# Patient Record
Sex: MALE | Race: WHITE | NOT HISPANIC OR LATINO | Employment: FULL TIME | URBAN - METROPOLITAN AREA
[De-identification: names, ages, dates, MRNs, and addresses within clinical notes are randomized per-mention and may not be internally consistent; named-entity substitution may affect disease eponyms.]

---

## 2017-07-31 ENCOUNTER — PRE VISIT (OUTPATIENT)
Dept: CARDIOLOGY | Facility: CLINIC | Age: 52
End: 2017-07-31

## 2017-07-31 LAB
ANION GAP SERPL CALCULATED.3IONS-SCNC: NORMAL MMOL/L
BUN SERPL-MCNC: 14 MG/DL (ref 9–26)
CALCIUM SERPL-MCNC: 9.2 MG/DL (ref 8.4–10.2)
CHLORIDE SERPLBLD-SCNC: NORMAL MMOL/L
CHOLEST SERPL-MCNC: 124 MG/DL (ref 0–199)
CO2 SERPL-SCNC: 27 MMOL/L (ref 22–31)
CREAT SERPL-MCNC: 1.16 MG/DL (ref 0.73–1.18)
GFR SERPL CREATININE-BSD FRML MDRD: >60 ML/MIN/1.73M2
GLUCOSE SERPL-MCNC: 98 MG/DL (ref 70–99)
HDLC SERPL-MCNC: 33 MG/DL
LDLC SERPL CALC-MCNC: 79 MG/DL (ref 19–130)
NONHDLC SERPL-MCNC: NORMAL MG/DL
POTASSIUM SERPL-SCNC: 3.8 MMOL/L (ref 3.5–5.2)
SODIUM SERPL-SCNC: 140 MMOL/L (ref 136–145)
TRIGL SERPL-MCNC: 59 MG/DL (ref 4–149)

## 2017-08-02 ENCOUNTER — OFFICE VISIT (OUTPATIENT)
Dept: CARDIOLOGY | Facility: CLINIC | Age: 52
End: 2017-08-02
Attending: INTERNAL MEDICINE
Payer: COMMERCIAL

## 2017-08-02 VITALS
BODY MASS INDEX: 23.82 KG/M2 | DIASTOLIC BLOOD PRESSURE: 68 MMHG | HEART RATE: 80 BPM | HEIGHT: 76 IN | SYSTOLIC BLOOD PRESSURE: 104 MMHG | WEIGHT: 195.6 LBS

## 2017-08-02 DIAGNOSIS — I21.4 NSTEMI (NON-ST ELEVATED MYOCARDIAL INFARCTION) (H): Primary | ICD-10-CM

## 2017-08-02 PROCEDURE — 99213 OFFICE O/P EST LOW 20 MIN: CPT | Performed by: INTERNAL MEDICINE

## 2017-08-02 NOTE — MR AVS SNAPSHOT
"              After Visit Summary   8/2/2017    Colby Marx    MRN: [id number]           Patient Information     Date Of Birth          1965        Visit Information        Provider Department      8/2/2017 8:45 AM Marcellus Upton MD [facility] [place]        Today's Diagnoses     NSTEMI (non-ST elevated myocardial infarction) (H)    -  1       Follow-ups after your visit        Additional Services     Follow-Up with Cardiologist                 Future tests that were ordered for you today     Open Future Orders        Priority Expected Expires Ordered    Echocardiogram Routine 8/2/2018 8/3/2018 8/2/2017    Follow-Up with Cardiologist Routine 8/2/2018 8/3/2018 8/2/2017            Who to contact     If you have questions or need follow up information about today's clinic visit or your schedule please contact [facility] directly at [phone].  Normal or non-critical lab and imaging results will be communicated to you by MyChart, letter or phone within 4 business days after the clinic has received the results. If you do not hear from us within 7 days, please contact the clinic through Reluxhart or phone. If you have a critical or abnormal lab result, we will notify you by phone as soon as possible.  Submit refill requests through Znode or call your pharmacy and they will forward the refill request to us. Please allow 3 business days for your refill to be completed.          Additional Information About Your Visit        MyChart Information     Znode lets you send messages to your doctor, view your test results, renew your prescriptions, schedule appointments and more. To sign up, go to [email or web address] . Click on \"Log in\" on the left side of the screen, which will take you to the Welcome page. Then click on \"Sign up Now\" on the right side of the page.     You will be asked to enter the access code listed below, as well as " "some personal information. Please follow the directions to create your username and password.     Your access code is: [email or web address]  Expires: 10/31/2017  9:21 AM     Your access code will  in 90 days. If you need help or a new code, please call your [facility] clinic or [phone].        Care EveryWhere ID     This is your Care EveryWhere ID. This could be used by other organizations to access your [facility] medical records  [id number]        Your Vitals Were     Pulse Height BMI (Body Mass Index)             80 1.93 m (6' 4\") 23.81 kg/m2          Blood Pressure from Last 3 Encounters:   17 104/68   16 118/68   02/10/16 100/70    Weight from Last 3 Encounters:   17 88.7 kg (195 lb 9.6 oz)   16 87.1 kg (192 lb)   02/10/16 89 kg (196 lb 4.8 oz)              We Performed the Following     Follow-Up with Cardiologist        Primary Care Provider Office Phone # Fax #    Saleem Gonzalez [phone]       [facility] [address] [place] [address]        Equal Access to Services     TAI AGUILAR : Hadii aad ku hadasho Soomaali, waaxda luqadaha, qaybta kaalmada adeegyada, waxay idiin hayaan monica toro lawilbert . So [facility] [phone].    ATENCIÓN: Si habla español, tiene a ramsey disposición servicios gratuitos de asistencia lingüística. Llame al [phone].    We comply with applicable federal civil rights laws and [place] laws. We do not discriminate on the basis of race, color, national origin, age, disability sex, sexual orientation or gender identity.            Thank you!     Thank you for choosing [facility]  for your care. Our goal is always to provide you with excellent care. Hearing back from our patients is one way we can continue to improve our services. Please take a few minutes to complete the written survey that you may receive in the mail after your visit with us. Thank you!             Your " Updated Medication List - Protect others around you: Learn how to safely use, store and throw away your medicines at www.disposemymeds.org.          This list is accurate as of: 8/2/17  9:21 AM.  Always use your most recent med list.                   Brand Name Dispense Instructions for use Diagnosis    aspirin 81 MG EC tablet      Take 1 tablet (81 mg) by mouth daily    NSTEMI (non-ST elevated myocardial infarction) (H)       COENZYME Q-10 PO      Take 100 mg by mouth daily        GINKOBA PO      Take 120 mg by mouth daily        MAGNESIUM OXIDE PO      Take 350 mg by mouth daily        MENS PROSTATE HEALTH FORMULA PO      Take 2 tablets by mouth daily        multivitamin, therapeutic with minerals Tabs tablet      Take 1 tablet by mouth daily Men's Multi Vitamin        * NONFORMULARY      Nioxin Hair Replacement Drops Rub into bald spot on head twice daily.        * NONFORMULARY      4 tablets daily [employer] (Nutritional Supplement)        * NONFORMULARY      Take by mouth daily CoCumerin, Electrolyte Replacement, 5 Hour Energy Beverage        PROBIOTIC DAILY PO      Take 2 tablets by mouth daily        STATIN NOT PRESCRIBED (INTENTIONAL)      Statin not prescribed intentionally due to normal coronary arteries and does not have significant elevated LDL.        VALTREX PO      Take 500 mg by mouth daily as needed (Mouth sores)        * Notice:  This list has 3 medication(s) that are the same as other medications prescribed for you. Read the directions carefully, and ask your doctor or other care provider to review them with you.

## 2017-08-02 NOTE — PROGRESS NOTES
HPI and Plan:     Mr. Marx is a pleasant 52-year-old gentleman with a history of a non-ST segment elevation MI in 08/2015 for which he eventually went to cardiac catheterization and was found to have normal coronary arteries.  I sent him for cardiac MRI which detailed a prior inferior infarction with an ejection fraction quantitated at 53%.  On delayed enhancement imaging, there was a subendocardial area of hyper-enhancement involving 51-75% of the basal inferior wall.  This was consistent with a small myocardial infarction in the right coronary artery.  It is presumed that the patient had a cardioembolic event and he did wear a monitor which showed no evidence for paroxysmal atrial fibrillation.  Interestingly, his sister was diagnosed with SCAD, however, there was no indication that the patient had a spontaneous coronary artery dissection on his cardiac catheterization.  He returns in routine annual follow-up and is feeling well from a cardiovascular standpoint.  He is exercising regularly and has had no cardiovascular complaints such as chest pain, chest pressure, shortness of breath, orthopnea, or paroxysmal nocturnal dyspnea.     Orders Placed This Encounter   Procedures     Follow-Up with Cardiologist     Echocardiogram       No orders of the defined types were placed in this encounter.      Medications Discontinued During This Encounter   Medication Reason     ranitidine (ZANTAC) 150 MG tablet Stopped by Patient         Encounter Diagnosis   Name Primary?     NSTEMI (non-ST elevated myocardial infarction) (H) Yes       CURRENT MEDICATIONS:  Current Outpatient Prescriptions   Medication Sig Dispense Refill     STATIN NOT PRESCRIBED, INTENTIONAL, Statin not prescribed intentionally due to normal coronary arteries and does not have significant elevated LDL.       aspirin EC 81 MG EC tablet Take 1 tablet (81 mg) by mouth daily       ValACYclovir HCl (VALTREX PO) Take 500 mg by mouth daily as needed (Mouth sores)        NONFORMULARY Nioxin Hair Replacement Drops  Rub into bald spot on head twice daily.       NONFORMULARY 4 tablets daily [employer] (Nutritional Supplement)       COENZYME Q-10 PO Take 100 mg by mouth daily       Ginkgo Biloba (GINKOBA PO) Take 120 mg by mouth daily       multivitamin, therapeutic with minerals (THERA-VIT-M) TABS Take 1 tablet by mouth daily Men's Multi Vitamin       Probiotic Product (PROBIOTIC DAILY PO) Take 2 tablets by mouth daily       MAGNESIUM OXIDE PO Take 350 mg by mouth daily       Misc Natural Products (MENS PROSTATE HEALTH FORMULA PO) Take 2 tablets by mouth daily       NONFORMULARY Take by mouth daily CoCumerin, Electrolyte Replacement, 5 Hour Energy Beverage         ALLERGIES   No Known Allergies    PAST MEDICAL HISTORY:  Past Medical History:   Diagnosis Date     Family history of abdominal aortic aneurysm      Family history of coronary artery disease     sister had SCAD episode     Gastro-oesophageal reflux disease      NSTEMI (non-ST elevated myocardial infarction) (H)     Aug 2015, cardiac cath: normal coronaries     Sleep apnea        PAST SURGICAL HISTORY:  No past surgical history on file.    FAMILY HISTORY:  Family History   Problem Relation Age of Onset     Coronary Artery Disease Mother 80     CABG     Coronary Artery Disease Father 56     MI     Coronary Artery Disease Sister 59     MI       SOCIAL HISTORY:  Social History     Social History     Marital status: Single     Spouse name: N/A     Number of children: N/A     Years of education: N/A     Social History Main Topics     Smoking status: Never Smoker     Smokeless tobacco: None     Alcohol use Yes      Comment: rare     Drug use: No     Sexual activity: Not Asked     Other Topics Concern     Parent/Sibling W/ Cabg, Mi Or Angioplasty Before 65f 55m? Yes     Caffeine Concern No     Special Diet Yes     Gluten and dairy free     Exercise Yes     2-3 times week, pilates, ellyptical and weights, yoga     Seat Belt  "Yes     Social History Narrative       Review of Systems:  Skin:  Negative       Eyes:  Positive for glasses reading  ENT:  Positive for nasal congestion    Respiratory:  Negative       Cardiovascular:  Negative      Gastroenterology: Positive for heartburn occ heartburn takes tums for them  Genitourinary:         Musculoskeletal:  Positive for back pain;neck pain    Neurologic:  Negative      Psychiatric:  Positive for excessive stress stress due to work  Heme/Lymph/Imm:  Negative      Endocrine:  Negative        Physical Exam:  Vitals: /68 (BP Location: Right arm, Patient Position: Sitting, Cuff Size: Adult Regular)  Pulse 80  Ht 1.93 m (6' 4\")  Wt 88.7 kg (195 lb 9.6 oz)  BMI 23.81 kg/m2    Constitutional:  cooperative;alert and oriented;well developed;in no acute distress        Skin:  warm and dry to the touch        Head:  normocephalic        Eyes:  pupils equal and round;sclera white        ENT:  no pallor or cyanosis        Neck:  JVP normal        Chest:  clear to auscultation          Cardiac: regular rhythm;normal S1 and S2                  Abdomen:  abdomen soft        Vascular: pulses full and equal                                        Extremities and Back:  no edema              Neurological:  no gross motor deficits;affect appropriate        Impression/plan:    The patient is a pleasant 52-year-old gentleman with a likely cardioembolic inferior myocardial infarction for which we have treated him with a baby aspirin.  He has not had a recurrent event since being placed on an antiplatelet.  His cardiac catheterization detailed no evidence for any atherosclerosis and given his baseline LDL we have elected not to initiate statin therapy.  He has also had no difficulties with arrhythmias and we have not used a beta blocker as his EF is low normal.  At this time I would recommend the patient return in one year with a repeat transthoracic echocardiogram to be completed at that time.    Marcellus " MD Alexsandra        CC  Marcellus Upton MD   [facility]  [address]  [place] [id number]

## 2017-08-02 NOTE — LETTER
8/2/2017    Saleem Gonzalez MD  [facility]   [address] [place] [address]    RE: Colby Marx       Dear Colleague,    I had the pleasure of seeing Colby Marx in the [facility].  HPI and Plan:     Mr. Marx is a pleasant 52-year-old gentleman with a history of a non-ST segment elevation MI in 08/2015 for which he eventually went to cardiac catheterization and was found to have normal coronary arteries.  I sent him for cardiac MRI which detailed a prior inferior infarction with an ejection fraction quantitated at 53%.  On delayed enhancement imaging, there was a subendocardial area of hyper-enhancement involving 51-75% of the basal inferior wall.  This was consistent with a small myocardial infarction in the right coronary artery.  It is presumed that the patient had a cardioembolic event and he did wear a monitor which showed no evidence for paroxysmal atrial fibrillation.  Interestingly, his sister was diagnosed with SCAD, however, there was no indication that the patient had a spontaneous coronary artery dissection on his cardiac catheterization.  He returns in routine annual follow-up and is feeling well from a cardiovascular standpoint.  He is exercising regularly and has had no cardiovascular complaints such as chest pain, chest pressure, shortness of breath, orthopnea, or paroxysmal nocturnal dyspnea.     Orders Placed This Encounter   Procedures     Follow-Up with Cardiologist     Echocardiogram       No orders of the defined types were placed in this encounter.      Medications Discontinued During This Encounter   Medication Reason     ranitidine (ZANTAC) 150 MG tablet Stopped by Patient         Encounter Diagnosis   Name Primary?     NSTEMI (non-ST elevated myocardial infarction) (H) Yes       CURRENT MEDICATIONS:  Current Outpatient Prescriptions   Medication Sig Dispense Refill     STATIN NOT PRESCRIBED, INTENTIONAL, Statin not prescribed  intentionally due to normal coronary arteries and does not have significant elevated LDL.       aspirin EC 81 MG EC tablet Take 1 tablet (81 mg) by mouth daily       ValACYclovir HCl (VALTREX PO) Take 500 mg by mouth daily as needed (Mouth sores)       NONFORMULARY Nioxin Hair Replacement Drops  Rub into bald spot on head twice daily.       NONFORMULARY 4 tablets daily [employer] (Nutritional Supplement)       COENZYME Q-10 PO Take 100 mg by mouth daily       Ginkgo Biloba (GINKOBA PO) Take 120 mg by mouth daily       multivitamin, therapeutic with minerals (THERA-VIT-M) TABS Take 1 tablet by mouth daily Men's Multi Vitamin       Probiotic Product (PROBIOTIC DAILY PO) Take 2 tablets by mouth daily       MAGNESIUM OXIDE PO Take 350 mg by mouth daily       Misc Natural Products (MENS PROSTATE HEALTH FORMULA PO) Take 2 tablets by mouth daily       NONFORMULARY Take by mouth daily CoCumerin, Electrolyte Replacement, 5 Hour Energy Beverage         ALLERGIES   No Known Allergies    PAST MEDICAL HISTORY:  Past Medical History:   Diagnosis Date     Family history of abdominal aortic aneurysm      Family history of coronary artery disease     sister had SCAD episode     Gastro-oesophageal reflux disease      NSTEMI (non-ST elevated myocardial infarction) (H)     Aug 2015, cardiac cath: normal coronaries     Sleep apnea        PAST SURGICAL HISTORY:  No past surgical history on file.    FAMILY HISTORY:  Family History   Problem Relation Age of Onset     Coronary Artery Disease Mother 80     CABG     Coronary Artery Disease Father 56     MI     Coronary Artery Disease Sister 59     MI       SOCIAL HISTORY:  Social History     Social History     Marital status: Single     Spouse name: N/A     Number of children: N/A     Years of education: N/A     Social History Main Topics     Smoking status: Never Smoker     Smokeless tobacco: None     Alcohol use Yes      Comment: rare     Drug use: No     Sexual activity: Not Asked  "    Other Topics Concern     Parent/Sibling W/ Cabg, Mi Or Angioplasty Before 65f 55m? Yes     Caffeine Concern No     Special Diet Yes     Gluten and dairy free     Exercise Yes     2-3 times week, pilates, ellyptical and weights, yoga     Seat Belt Yes     Social History Narrative       Review of Systems:  Skin:  Negative       Eyes:  Positive for glasses reading  ENT:  Positive for nasal congestion    Respiratory:  Negative       Cardiovascular:  Negative      Gastroenterology: Positive for heartburn occ heartburn takes tums for them  Genitourinary:         Musculoskeletal:  Positive for back pain;neck pain    Neurologic:  Negative      Psychiatric:  Positive for excessive stress stress due to work  Heme/Lymph/Imm:  Negative      Endocrine:  Negative        Physical Exam:  Vitals: /68 (BP Location: Right arm, Patient Position: Sitting, Cuff Size: Adult Regular)  Pulse 80  Ht 1.93 m (6' 4\")  Wt 88.7 kg (195 lb 9.6 oz)  BMI 23.81 kg/m2    Constitutional:  cooperative;alert and oriented;well developed;in no acute distress        Skin:  warm and dry to the touch        Head:  normocephalic        Eyes:  pupils equal and round;sclera white        ENT:  no pallor or cyanosis        Neck:  JVP normal        Chest:  clear to auscultation          Cardiac: regular rhythm;normal S1 and S2                  Abdomen:  abdomen soft        Vascular: pulses full and equal                                        Extremities and Back:  no edema              Neurological:  no gross motor deficits;affect appropriate        Impression/plan:    The patient is a pleasant 52-year-old gentleman with a likely cardioembolic inferior myocardial infarction for which we have treated him with a baby aspirin.  He has not had a recurrent event since being placed on an antiplatelet.  His cardiac catheterization detailed no evidence for any atherosclerosis and given his baseline LDL we have elected not to initiate statin therapy.  He has " also had no difficulties with arrhythmias and we have not used a beta blocker as his EF is low normal.  At this time I would recommend the patient return in one year with a repeat transthoracic echocardiogram to be completed at that time.    Thank you for allowing me to participate in the care of your patient.    Sincerely,     Marcellus Upton MD     [facility]

## 2018-05-08 ENCOUNTER — HOSPITAL ENCOUNTER (EMERGENCY)
Facility: CLINIC | Age: 53
Discharge: HOME | End: 2018-05-08
Attending: EMERGENCY MEDICINE | Admitting: EMERGENCY MEDICINE
Payer: COMMERCIAL

## 2018-05-08 VITALS
DIASTOLIC BLOOD PRESSURE: 90 MMHG | RESPIRATION RATE: 8 BRPM | HEIGHT: 76 IN | BODY MASS INDEX: 23.02 KG/M2 | SYSTOLIC BLOOD PRESSURE: 119 MMHG | WEIGHT: 189 LBS | OXYGEN SATURATION: 98 % | TEMPERATURE: 97.7 F

## 2018-05-08 DIAGNOSIS — I48.19 PERSISTENT ATRIAL FIBRILLATION (H): ICD-10-CM

## 2018-05-08 LAB
ANION GAP SERPL CALCULATED.3IONS-SCNC: 8 MMOL/L (ref 3–14)
BASOPHILS # BLD AUTO: 0 10E9/L (ref 0–0.2)
BASOPHILS NFR BLD AUTO: 0.1 %
BUN SERPL-MCNC: 17 MG/DL (ref 7–30)
CALCIUM SERPL-MCNC: 9.1 MG/DL (ref 8.5–10.1)
CHLORIDE SERPL-SCNC: 108 MMOL/L (ref 94–109)
CO2 SERPL-SCNC: 27 MMOL/L (ref 20–32)
CREAT SERPL-MCNC: 1.13 MG/DL (ref 0.66–1.25)
DIFFERENTIAL METHOD BLD: ABNORMAL
EOSINOPHIL # BLD AUTO: 0 10E9/L (ref 0–0.7)
EOSINOPHIL NFR BLD AUTO: 0.4 %
ERYTHROCYTE [DISTWIDTH] IN BLOOD BY AUTOMATED COUNT: 12.5 % (ref 10–15)
GFR SERPL CREATININE-BSD FRML MDRD: 67 ML/MIN/1.7M2
GLUCOSE SERPL-MCNC: 117 MG/DL (ref 70–99)
HCT VFR BLD AUTO: 43.9 % (ref 40–53)
HGB BLD-MCNC: 15.2 G/DL (ref 13.3–17.7)
IMM GRANULOCYTES # BLD: 0 10E9/L (ref 0–0.4)
IMM GRANULOCYTES NFR BLD: 0.2 %
INTERPRETATION ECG - MUSE: NORMAL
LYMPHOCYTES # BLD AUTO: 1.8 10E9/L (ref 0.8–5.3)
LYMPHOCYTES NFR BLD AUTO: 19.8 %
MCH RBC QN AUTO: 33.2 PG (ref 26.5–33)
MCHC RBC AUTO-ENTMCNC: 34.6 G/DL (ref 31.5–36.5)
MCV RBC AUTO: 96 FL (ref 78–100)
MONOCYTES # BLD AUTO: 0.8 10E9/L (ref 0–1.3)
MONOCYTES NFR BLD AUTO: 9.3 %
NEUTROPHILS # BLD AUTO: 6.3 10E9/L (ref 1.6–8.3)
NEUTROPHILS NFR BLD AUTO: 70.2 %
NRBC # BLD AUTO: 0 10*3/UL
NRBC BLD AUTO-RTO: 0 /100
PLATELET # BLD AUTO: 168 10E9/L (ref 150–450)
POTASSIUM SERPL-SCNC: 3.6 MMOL/L (ref 3.4–5.3)
RBC # BLD AUTO: 4.58 10E12/L (ref 4.4–5.9)
SODIUM SERPL-SCNC: 143 MMOL/L (ref 133–144)
TROPONIN I SERPL-MCNC: <0.015 UG/L (ref 0–0.04)
TSH SERPL DL<=0.005 MIU/L-ACNC: 2.46 MU/L (ref 0.4–4)
WBC # BLD AUTO: 9 10E9/L (ref 4–11)

## 2018-05-08 PROCEDURE — 84443 ASSAY THYROID STIM HORMONE: CPT | Performed by: EMERGENCY MEDICINE

## 2018-05-08 PROCEDURE — 99284 EMERGENCY DEPT VISIT MOD MDM: CPT

## 2018-05-08 PROCEDURE — 85025 COMPLETE CBC W/AUTO DIFF WBC: CPT | Performed by: EMERGENCY MEDICINE

## 2018-05-08 PROCEDURE — 84484 ASSAY OF TROPONIN QUANT: CPT | Performed by: EMERGENCY MEDICINE

## 2018-05-08 PROCEDURE — 93005 ELECTROCARDIOGRAM TRACING: CPT

## 2018-05-08 PROCEDURE — 80048 BASIC METABOLIC PNL TOTAL CA: CPT | Performed by: EMERGENCY MEDICINE

## 2018-05-08 ASSESSMENT — ENCOUNTER SYMPTOMS
PALPITATIONS: 1
FEVER: 0
COUGH: 0
ABDOMINAL PAIN: 0
SHORTNESS OF BREATH: 0

## 2018-05-08 NOTE — ED AVS SNAPSHOT
Emergency Department    [address] [place] [address]    Phone:  [phone]    Fax:  [phone]                                       Colby Marx   MRN: [id number]    Department:   Emergency Department   Date of Visit:  5/8/2018           After Visit Summary Signature Page     I have received my discharge instructions, and my questions have been answered. I have discussed any challenges I see with this plan with the nurse or doctor.    ..........................................................................................................................................  Patient/Patient Representative Signature      ..........................................................................................................................................  Patient Representative Print Name and Relationship to Patient    ..................................................               ................................................  Date                                            Time    ..........................................................................................................................................  Reviewed by Signature/Title    ...................................................              ..............................................  Date                                                            Time

## 2018-05-08 NOTE — ED AVS SNAPSHOT
Emergency Department    [address] [facility] [address]    Phone:  [phone]    Fax:  [phone]                                       Colby Marx   MRN: [id number]    Department:   Emergency Department   Date of Visit:  5/8/2018           Patient Information     Date Of Birth          1965        Your diagnoses for this visit were:     Persistent atrial fibrillation (H)        You were seen by Deion Li MD.      Follow-up Information     Schedule an appointment as soon as possible for a visit with Marcellus Upton MD.    Specialty:  Cardiology    Contact information:    [address]  [facility] [id number] [phone]          Follow up with  Emergency Department.    Specialty:  EMERGENCY MEDICINE    Why:  As needed, If symptoms worsen    Contact information:    [address] [facility] [id number] [phone]        Discharge Instructions         Discharge Instructions for Atrial Fibrillation  You have been diagnosed with an abnormal heart rhythm called atrial fibrillation. With this condition, your heart s 2 upper chambers quiver rather than squeeze the blood out in a normal pattern. This leads to an irregular and sometimes rapid heartbeat. Some people will develop associated symptoms such as a flip-flopping heartbeat, chest pain, lightheadedness, or shortness of breath. Other people may have no symptoms at all. Atrial fibrillation is serious because it affects the heart s ability to fill with blood as it should. Blood clots may form. This increases the risk for stroke. Untreated atrial fibrillation can also lead to heart failure. Atrial fibrillation can be controlled. With treatment, most people with atrial fibrillation lead normal lives.  Treatment options  Recommended treatment for atrial fibrillation depends on your age, symptoms, how long you have had atrial fibrillation, and other factors. You will have a complete evaluation to find out if  you have any abnormalities that caused your heart to go into atrial fibrillation. This might be blocked heart arteries or a thyroid problem. Your doctor will assess your particular case and discuss choices with you.  Treatment choices may include:    Treating an underlying disorder that puts you at risk for atrial fibrillation. For example, correcting an abnormal thyroid or electrolyte problem, or treating a blocked heart artery.    Restoring a normal heart rhythm with an electrical shock (cardioversion) or with an antiarrhythmic medicine (chemical cardioversion)    Using medicine to control your heart rate in atrial fibrillation.    Preventing the risk for blood clot and stroke using blood-thinning medicines. Your doctor will tell you what he or she recommends. Choices may include aspirin, clopidogrel, warfarin, dabigatran, rivaroxaban, apixaban, and edoxaban.    Doing catheter ablation or a surgical maze procedure. These use different methods to destroy certain areas of heart tissue. This interrupts the electrical signals causing atrial fibrillation. One of these procedures may be a choice when medicines do not work, or as an alternative to long-term medicine.    Other treatment choices may be recommended for you by your doctor.  Managing risk factors for stroke and preventing heart failure are important parts of any treatment plan for atrial fibrillation.  Home care    Take your medicines exactly as directed. Don t skip doses.    Work with your doctor to find the right medicines and doses for you.    Learn to take your own pulse. Keep a record of your results. Ask your doctor which pulse rates mean that you need medical attention. Slowing your pulse is often the goal of treatment. Ask your doctor if it s OK for you to use an automatic machine to check your pulse at home. Sometimes these machines don t count the pulse correctly when you have atrial fibrillation.    Limit your intake of coffee, tea, cola, and other  beverages with caffeine. Talk with your doctor about whether you should eliminate caffeine.    Avoid over-the-counter medicines that have caffeine in them.    Let your doctor know what medicines you take, including prescription and over-the-counter medicines, as well as any supplements. They interfere with some medicines given for atrial fibrillation.    Ask your doctor about whether you can drink alcohol. Some people need to avoid alcohol to better treat atrial fibrillation. If you are taking blood-thinner medicines, alcohol may interfere with them by increasing their effect.    Never take stimulants such as amphetamines or cocaine. These drugs can speed up your heart rate and trigger atrial fibrillation.  Follow-up care  Follow up with your doctor, or as advised.     When should I call my healthcare provider  Call your healthcare provider right away if you have any of the following:    Weakness    Dizziness    Fainting    Fatigue    Shortness of breath    Chest pain with increased activity    A change in the usual regularity of your heartbeat, or an unusually fast heartbeat   Date Last Reviewed: 4/23/2016 2000-2017 [employer] [address] [place] [address]. All rights reserved. This information is not intended as a substitute for professional medical care. Always follow your healthcare professional's instructions.          Your next 10 appointments already scheduled     Aug 06, 2018  1:30 PM CDT   Ech Complete with [id number]   [facility] ([facility])    [address]  [facility] [address] [phone]           1. Please bring or wear a comfortable two-piece outfit. 2. You may eat, drink and take your normal medicines. 3. For any questions that cannot be answered, please contact the ordering physician ***Please check-in at the [place] Registration Office located in [address] in the [facility] [place] building.  When you are finished registering, please go to [address] and have a seat. The technician will call your name for the test.            Aug 06, 2018  4:15 PM CDT   Return Visit with Marcellus Upton MD   [facility] ([facility])    [address]  [facility] [address] [phone]              24 Hour Appointment Hotline       To make an appointment at any [facility], call [phone] ([phone]). If you don't have a family doctor or clinic, we will help you find one. [facility] clinics are conveniently located to serve the needs of you and your family.             Review of your medicines      Our records show that you are taking the medicines listed below. If these are incorrect, please call your family doctor or clinic.        Dose / Directions Last dose taken    aspirin 81 MG EC tablet   Dose:  81 mg        Take 1 tablet (81 mg) by mouth daily   Refills:  0        COENZYME Q-10 PO   Dose:  100 mg        Take 100 mg by mouth daily   Refills:  0        GINKOBA PO   Dose:  120 mg        Take 120 mg by mouth daily   Refills:  0        MAGNESIUM OXIDE PO   Dose:  350 mg        Take 350 mg by mouth daily   Refills:  0        MENS PROSTATE HEALTH FORMULA PO   Dose:  2 tablet        Take 2 tablets by mouth daily   Refills:  0        multivitamin, therapeutic with minerals Tabs tablet   Dose:  1 tablet        Take 1 tablet by mouth daily Men's Multi Vitamin   Refills:  0        NONFORMULARY        Nioxin Hair Replacement Drops Rub into bald spot on head twice daily.   Refills:  0        NONFORMULARY   Dose:  4 tablet        4 tablets daily [employer] (Nutritional Supplement)   Refills:  0        NONFORMULARY        Take by mouth daily CoCumerin, Electrolyte Replacement, 5 Hour Energy Beverage   Refills:  0        PROBIOTIC DAILY PO   Dose:  2 tablet        Take 2 tablets by mouth daily   Refills:  0        STATIN NOT PRESCRIBED  (INTENTIONAL)        Statin not prescribed intentionally due to normal coronary arteries and does not have significant elevated LDL.   Refills:  0        VALTREX PO   Dose:  500 mg        Take 500 mg by mouth daily as needed (Mouth sores)   Refills:  0                Procedures and tests performed during your visit     Basic metabolic panel    CBC with platelets + differential    EKG 12-lead, tracing only    TSH with free T4 reflex    Troponin I (now)      Orders Needing Specimen Collection     None      Pending Results     No orders found from 5/6/2018 to 5/9/2018.            Pending Culture Results     No orders found from 5/6/2018 to 5/9/2018.            Pending Results Instructions     If you had any lab results that were not finalized at the time of your Discharge, you can call the ED Lab Result RN at [phone]. You will be contacted by this team for any positive Lab results or changes in treatment. The nurses are available 7 days a week from 10A to 6:30P.  You can leave a message 24 hours per day and they will return your call.        Test Results From Your Hospital Stay        5/8/2018  7:36 PM      Component Results     Component Value Ref Range & Units Status    WBC 9.0 4.0 - 11.0 10e9/L Final    RBC Count 4.58 4.4 - 5.9 10e12/L Final    Hemoglobin 15.2 13.3 - 17.7 g/dL Final    Hematocrit 43.9 40.0 - 53.0 % Final    MCV 96 78 - 100 fl Final    MCH 33.2 (H) 26.5 - 33.0 pg Final    MCHC 34.6 31.5 - 36.5 g/dL Final    RDW 12.5 10.0 - 15.0 % Final    Platelet Count 168 150 - 450 10e9/L Final    Diff Method Automated Method  Final    % Neutrophils 70.2 % Final    % Lymphocytes 19.8 % Final    % Monocytes 9.3 % Final    % Eosinophils 0.4 % Final    % Basophils 0.1 % Final    % Immature Granulocytes 0.2 % Final    Nucleated RBCs 0 0 /100 Final    Absolute Neutrophil 6.3 1.6 - 8.3 10e9/L Final    Absolute Lymphocytes 1.8 0.8 - 5.3 10e9/L Final    Absolute Monocytes 0.8 0.0 - 1.3 10e9/L Final    Absolute  Eosinophils 0.0 0.0 - 0.7 10e9/L Final    Absolute Basophils 0.0 0.0 - 0.2 10e9/L Final    Abs Immature Granulocytes 0.0 0 - 0.4 10e9/L Final    Absolute Nucleated RBC 0.0  Final         5/8/2018  7:47 PM      Component Results     Component Value Ref Range & Units Status    Sodium 143 133 - 144 mmol/L Final    Potassium 3.6 3.4 - 5.3 mmol/L Final    Chloride 108 94 - 109 mmol/L Final    Carbon Dioxide 27 20 - 32 mmol/L Final    Anion Gap 8 3 - 14 mmol/L Final    Glucose 117 (H) 70 - 99 mg/dL Final    Urea Nitrogen 17 7 - 30 mg/dL Final    Creatinine 1.13 0.66 - 1.25 mg/dL Final    GFR Estimate 67 >60 mL/min/1.7m2 Final    Non  GFR Calc    GFR Estimate If Black 82 >60 mL/min/1.7m2 Final    African American GFR Calc    Calcium 9.1 8.5 - 10.1 mg/dL Final         5/8/2018  7:51 PM      Component Results     Component Value Ref Range & Units Status    Troponin I ES <0.015 0.000 - 0.045 ug/L Final    The 99th percentile for upper reference range is 0.045 ug/L.  Troponin values   in the range of 0.045 - 0.120 ug/L may be associated with risks of adverse   clinical events.           5/8/2018  7:56 PM      Component Results     Component Value Ref Range & Units Status    TSH 2.46 0.40 - 4.00 mU/L Final                Clinical Quality Measure: Blood Pressure Screening     Your blood pressure was checked while you were in the emergency department today. The last reading we obtained was  BP: 109/84 . Please read the guidelines below about what these numbers mean and what you should do about them.  If your systolic blood pressure (the top number) is less than 120 and your diastolic blood pressure (the bottom number) is less than 80, then your blood pressure is normal. There is nothing more that you need to do about it.  If your systolic blood pressure (the top number) is 120-139 or your diastolic blood pressure (the bottom number) is 80-89, your blood pressure may be higher than it should be. You should have  "your blood pressure rechecked within a year by a primary care provider.  If your systolic blood pressure (the top number) is 140 or greater or your diastolic blood pressure (the bottom number) is 90 or greater, you may have high blood pressure. High blood pressure is treatable, but if left untreated over time it can put you at risk for heart attack, stroke, or kidney failure. You should have your blood pressure rechecked by a primary care provider within the next 4 weeks.  If your provider in the emergency department today gave you specific instructions to follow-up with your doctor or provider even sooner than that, you should follow that instruction and not wait for up to 4 weeks for your follow-up visit.        Thank you for choosing [facility]       Thank you for choosing [facility] for your care. Our goal is always to provide you with excellent care. Hearing back from our patients is one way we can continue to improve our services. Please take a few minutes to complete the written survey that you may receive in the mail after you visit with us. Thank you!        [employer] Information     [facility] lets you send messages to your doctor, view your test results, renew your prescriptions, schedule appointments and more. To sign up, go to [email or web address] . Click on \"Log in\" on the left side of the screen, which will take you to the Welcome page. Then click on \"Sign up Now\" on the right side of the page.     You will be asked to enter the access code listed below, as well as some personal information. Please follow the directions to create your username and password.     Your access code is: [email or web address]  Expires: 2018  8:33 PM     Your access code will  in 90 days. If you need help or a new code, please call your [facility] clinic or [phone].        Care EveryWhere ID     This is your Care EveryWhere ID. This could be used by other organizations to access your [facility] medical records  [id number]      "   Equal Access to Services     TAI AGUILAR : Nimisha Pelayo, trevon schwarz, leni hurd. So [facility] [phone].    ATENCIÓN: Si habla español, tiene a ramsey disposición servicios gratuitos de asistencia lingüística. Llame al [phone].    We comply with applicable federal civil rights laws and [place] laws. We do not discriminate on the basis of race, color, national origin, age, disability, sex, sexual orientation, or gender identity.            After Visit Summary       This is your record. Keep this with you and show to your community pharmacist(s) and doctor(s) at your next visit.

## 2018-05-09 NOTE — ED NOTES
IV inserted on first attempt. Blood specimens obtained and sent to the lab. Pt on bedside cardiac monitor. Controlled AFib.

## 2018-05-09 NOTE — DISCHARGE INSTRUCTIONS
Discharge Instructions for Atrial Fibrillation  You have been diagnosed with an abnormal heart rhythm called atrial fibrillation. With this condition, your heart s 2 upper chambers quiver rather than squeeze the blood out in a normal pattern. This leads to an irregular and sometimes rapid heartbeat. Some people will develop associated symptoms such as a flip-flopping heartbeat, chest pain, lightheadedness, or shortness of breath. Other people may have no symptoms at all. Atrial fibrillation is serious because it affects the heart s ability to fill with blood as it should. Blood clots may form. This increases the risk for stroke. Untreated atrial fibrillation can also lead to heart failure. Atrial fibrillation can be controlled. With treatment, most people with atrial fibrillation lead normal lives.  Treatment options  Recommended treatment for atrial fibrillation depends on your age, symptoms, how long you have had atrial fibrillation, and other factors. You will have a complete evaluation to find out if you have any abnormalities that caused your heart to go into atrial fibrillation. This might be blocked heart arteries or a thyroid problem. Your doctor will assess your particular case and discuss choices with you.  Treatment choices may include:    Treating an underlying disorder that puts you at risk for atrial fibrillation. For example, correcting an abnormal thyroid or electrolyte problem, or treating a blocked heart artery.    Restoring a normal heart rhythm with an electrical shock (cardioversion) or with an antiarrhythmic medicine (chemical cardioversion)    Using medicine to control your heart rate in atrial fibrillation.    Preventing the risk for blood clot and stroke using blood-thinning medicines. Your doctor will tell you what he or she recommends. Choices may include aspirin, clopidogrel, warfarin, dabigatran, rivaroxaban, apixaban, and edoxaban.    Doing catheter ablation or a surgical maze  procedure. These use different methods to destroy certain areas of heart tissue. This interrupts the electrical signals causing atrial fibrillation. One of these procedures may be a choice when medicines do not work, or as an alternative to long-term medicine.    Other treatment choices may be recommended for you by your doctor.  Managing risk factors for stroke and preventing heart failure are important parts of any treatment plan for atrial fibrillation.  Home care    Take your medicines exactly as directed. Don t skip doses.    Work with your doctor to find the right medicines and doses for you.    Learn to take your own pulse. Keep a record of your results. Ask your doctor which pulse rates mean that you need medical attention. Slowing your pulse is often the goal of treatment. Ask your doctor if it s OK for you to use an automatic machine to check your pulse at home. Sometimes these machines don t count the pulse correctly when you have atrial fibrillation.    Limit your intake of coffee, tea, cola, and other beverages with caffeine. Talk with your doctor about whether you should eliminate caffeine.    Avoid over-the-counter medicines that have caffeine in them.    Let your doctor know what medicines you take, including prescription and over-the-counter medicines, as well as any supplements. They interfere with some medicines given for atrial fibrillation.    Ask your doctor about whether you can drink alcohol. Some people need to avoid alcohol to better treat atrial fibrillation. If you are taking blood-thinner medicines, alcohol may interfere with them by increasing their effect.    Never take stimulants such as amphetamines or cocaine. These drugs can speed up your heart rate and trigger atrial fibrillation.  Follow-up care  Follow up with your doctor, or as advised.     When should I call my healthcare provider  Call your healthcare provider right away if you have any of the  following:    Weakness    Dizziness    Fainting    Fatigue    Shortness of breath    Chest pain with increased activity    A change in the usual regularity of your heartbeat, or an unusually fast heartbeat   Date Last Reviewed: 4/23/2016 2000-2017 [employer]. [address] [place] [address], [place] [address]. All rights reserved. This information is not intended as a substitute for professional medical care. Always follow your healthcare professional's instructions.

## 2018-05-09 NOTE — ED PROVIDER NOTES
"  History     Chief Complaint:  Palpitations    The history is provided by the patient.      Colby Marx is a 52 year old male with a history of heart attack who presents to the emergency department today for evaluation of palpitations. Off and on today, the patient has noticed some fluttering in his heart's rhythm. The patient has had no chest pain, pain in his arms, or shortness of breath. He does not think this feels like when he had a heart attack. Of note, he has noticed this before today (1-2 times per week), and just thinks that it has worsened today.     Allergies:  No Known Drug Allergies    Medications:    aspirin EC 81 MG EC tablet  Ginkgo Biloba (GINKOBA PO)  Misc Natural Products (MENS PROSTATE HEALTH FORMULA PO)  multivitamin, therapeutic with minerals (THERA-VIT-M) TABS  STATIN NOT PRESCRIBED, INTENTIONAL,  ValACYclovir HCl (VALTREX PO)  CoQ10    Past Medical History:    Sleep apnea    Past Surgical History:    History reviewed. No pertinent surgical history.    Family History:    CABG  Mother  MI  Father   MI   Sister    Social History:  The patient was accompanied to the ED by himself.  Smoking Status: Never Smoker  Alcohol Use: Positive  Marital Status:  Single     Review of Systems   Constitutional: Negative for fever.   Respiratory: Negative for cough and shortness of breath.    Cardiovascular: Positive for palpitations. Negative for chest pain.   Gastrointestinal: Negative for abdominal pain.   All other systems reviewed and are negative.    Physical Exam     Patient Vitals for the past 24 hrs:   BP Temp Temp src Heart Rate Resp SpO2 Height Weight   05/08/18 2030 119/90 - - 60 8 98 % - -   05/08/18 1915 125/74 - - 74 15 99 % - -   05/08/18 1843 109/84 97.7  F (36.5  C) Oral 78 18 99 % 1.93 m (6' 4\") 85.7 kg (189 lb)      Physical Exam  General: Appears well-developed and well-nourished.   Head: No signs of trauma.   CV: Irregularly irregular and regular rhythm.    Resp: Effort normal and " breath sounds normal. No respiratory distress.   GI: Soft. There is no tenderness.  No rebound or guarding.  Normal bowel sounds.    MSK: Normal range of motion. no edema. No Calf tenderness.  Neuro: The patient is alert and oriented.  Strength in upper/lower extremities normal and symmetrical.   Sensation normal. Speech normal.  GCS 15  Skin: Skin is warm and dry. No rash noted.   Psych: normal mood and affect. behavior is normal.       Emergency Department Course     ECG:  ECG taken at 1844, ECG read at 1900  Atrial fibrillation with a competing junctional pacemaker  Rightward axis  Abnormal ECG  Rate 80 bpm. NM interval * ms. QRS duration 96 ms. QT/QTc 360/415 ms. P-R-T axes * 90 67.    Laboratory:  Laboratory findings were communicated with the patient who voiced understanding of the findings.    CBC: WBC 9.0, HGB 15.2,   BMP: Glucose: 117 o/w WNL (Creatinine 1.13)  Troponin I (1915): <0.015  TSH with free T4 reflex: 2.46    Emergency Department Course:    1843 Nursing notes and vitals reviewed.    1900 I performed an exam of the patient as documented above.     1919 IV was inserted and blood was drawn for laboratory testing, results above.     2017 I personally reviewed the lab results with the patient and answered all related questions prior to discharge.    Impression & Plan      Medical Decision Making:  Colby Marx is a 52 year old male who presents to the emergency department today for evaluation of 52-year-old gentleman who presents due to the feeling of palpitations.  He reports that for a little while he has felt some fluttering and palpitations is been more noticeable sometimes versus others.  It is particularly noticeable today prompting him to come to the ER.  On my evaluation, he was irregularly irregular.  EKG was obtained that did show atrial fibrillation although his heart rate was well controlled.  He was primarily in the 70s and 80s with an appropriate blood pressure.  Patient does  have a cardiac history, but states that this felt very different than when he had a heart attack in the past.  I obtained blood work including a troponin which was negative.  Given the unclear onset of his symptoms, I do not feel the patient was a candidate for cardioversion.  Given his heart rate is been well controlled, I do not feel that he needed admission for rate control either.  Patient to be discharged home with recommendation for close follow-up with his primary care doctor and cardiology.  He does have a CHADVASC score of 1.  I did discuss with him starting anticoagulation, but the patient much preferred to speak with his cardiologist to discuss this in more detail.  I did discuss the potential risks regarding clot and stroke.  He was instructed to return if he had racing heart rate, pain, or any other new concerning symptoms.      Diagnosis:    ICD-10-CM    1. Persistent atrial fibrillation (H) I48.1      Disposition:   Discharge    Scribe Disclosure:  Michael WASHINGTON, am serving as a scribe at 7:01 PM on 5/8/2018 to document services personally performed by Deion Li MD based on my observations and the provider's statements to me.      EMERGENCY DEPARTMENT       Deion Li MD  05/08/18 4156

## 2018-05-14 ENCOUNTER — OFFICE VISIT (OUTPATIENT)
Dept: CARDIOLOGY | Facility: CLINIC | Age: 53
End: 2018-05-14
Payer: COMMERCIAL

## 2018-05-14 VITALS
HEART RATE: 72 BPM | DIASTOLIC BLOOD PRESSURE: 74 MMHG | WEIGHT: 195 LBS | SYSTOLIC BLOOD PRESSURE: 102 MMHG | HEIGHT: 76 IN | BODY MASS INDEX: 23.75 KG/M2

## 2018-05-14 DIAGNOSIS — I48.0 PAROXYSMAL ATRIAL FIBRILLATION (H): ICD-10-CM

## 2018-05-14 DIAGNOSIS — I21.4 NSTEMI (NON-ST ELEVATED MYOCARDIAL INFARCTION) (H): Primary | ICD-10-CM

## 2018-05-14 PROCEDURE — 99214 OFFICE O/P EST MOD 30 MIN: CPT | Performed by: INTERNAL MEDICINE

## 2018-05-14 RX ORDER — CHLORAL HYDRATE 500 MG
1 CAPSULE ORAL 3 TIMES DAILY
COMMUNITY
End: 2018-05-14

## 2018-05-14 RX ORDER — GLUCOSAM/CHONDRO/HERB 149/HYAL 750-100 MG
TABLET ORAL 3 TIMES DAILY
COMMUNITY
End: 2018-05-14

## 2018-05-14 RX ORDER — CHOLECALCIFEROL (VITAMIN D3) 25 MCG
CAPSULE ORAL 3 TIMES DAILY
COMMUNITY
End: 2019-05-30

## 2018-05-14 NOTE — LETTER
5/14/2018    Saleem Gonzalez MD  [facility] [address] [place] [address]    RE: Colby Marx       Dear Colleague,    I had the pleasure of seeing Colby Marx in the [facility].    HPI and Plan:   See dictation    Orders Placed This Encounter   Procedures     Follow-Up with Cardiac Advanced Practice Provider     Echocardiogram       Orders Placed This Encounter   Medications     DISCONTD: Misc Natural Products (GLUCOSAMINE CHOND COMPLEX/MSM) TABS     Sig: Take by mouth 3 times daily     DISCONTD: Hyaluronic Acid-Vitamin C (HYALURONIC ACID PO)     Sig: Take by mouth 3 times daily     DISCONTD: fish oil-omega-3 fatty acids 1000 MG capsule     Sig: Take 1 g by mouth 3 times daily     Cholecalciferol (VITAMIN D-3) 1000 units CAPS     Sig: Take by mouth 3 times daily     DISCONTD: Misc Natural Products (SUPER GRAPEFRUIT N FIBER DIET PO)     Sig: Take by mouth 3 times daily     DISCONTD: MISC NATURAL PRODUCTS PO     Sig: Take 2 tablets by mouth 3 times daily Zyflamend     apixaban ANTICOAGULANT (ELIQUIS) 5 MG tablet     Sig: Take 1 tablet (5 mg) by mouth 2 times daily     Dispense:  60 tablet     Refill:  11       Medications Discontinued During This Encounter   Medication Reason     MAGNESIUM OXIDE PO Stopped by Patient     NONFORMULARY Not filled/taken by Patient     Misc Natural Products (SUPER GRAPEFRUIT N FIBER DIET PO)      COENZYME Q-10 PO      fish oil-omega-3 fatty acids 1000 MG capsule      Hyaluronic Acid-Vitamin C (HYALURONIC ACID PO)      Misc Natural Products (GLUCOSAMINE CHOND COMPLEX/MSM) TABS      Misc Natural Products (MENS PROSTATE HEALTH FORMULA PO)      MISC NATURAL PRODUCTS PO      aspirin EC 81 MG EC tablet          Encounter Diagnoses   Name Primary?     NSTEMI (non-ST elevated myocardial infarction) (H) Yes     Paroxysmal atrial fibrillation (H)        CURRENT MEDICATIONS:  Current Outpatient Prescriptions   Medication Sig Dispense  Refill     apixaban ANTICOAGULANT (ELIQUIS) 5 MG tablet Take 1 tablet (5 mg) by mouth 2 times daily 60 tablet 11     Cholecalciferol (VITAMIN D-3) 1000 units CAPS Take by mouth 3 times daily       Ginkgo Biloba (GINKOBA PO) Take 120 mg by mouth daily       multivitamin, therapeutic with minerals (THERA-VIT-M) TABS Take 1 tablet by mouth daily Men's Multi Vitamin       NONFORMULARY Nioxin Hair Replacement Drops  Rub into bald spot on head twice daily.       NONFORMULARY Take by mouth daily CoCumerin, Electrolyte Replacement, 5 Hour Energy Beverage       Probiotic Product (PROBIOTIC DAILY PO) Take 2 tablets by mouth daily       ValACYclovir HCl (VALTREX PO) Take 500 mg by mouth daily as needed (Mouth sores)       STATIN NOT PRESCRIBED, INTENTIONAL, Statin not prescribed intentionally due to normal coronary arteries and does not have significant elevated LDL.         ALLERGIES   No Known Allergies    PAST MEDICAL HISTORY:  Past Medical History:   Diagnosis Date     Family history of abdominal aortic aneurysm      Family history of coronary artery disease     sister had SCAD episode     Gastro-oesophageal reflux disease      NSTEMI (non-ST elevated myocardial infarction) (H)     Aug 2015, cardiac cath: normal coronaries     Sleep apnea        PAST SURGICAL HISTORY:  No past surgical history on file.    FAMILY HISTORY:  Family History   Problem Relation Age of Onset     Coronary Artery Disease Mother 80     CABG     Coronary Artery Disease Father 56     MI     Coronary Artery Disease Sister 59     MI     Arrhythmia Brother      Coronary Artery Disease Sister      No Known Problems Sister        SOCIAL HISTORY:  Social History     Social History     Marital status: Single     Spouse name: N/A     Number of children: N/A     Years of education: N/A     Social History Main Topics     Smoking status: Never Smoker     Smokeless tobacco: Never Used     Alcohol use Yes      Comment: rare     Drug use: No     Sexual activity:  "Not Asked     Other Topics Concern     Parent/Sibling W/ Cabg, Mi Or Angioplasty Before 65f 55m? Yes     Caffeine Concern No     Special Diet Yes     Gluten and dairy free     Exercise Yes     2-3 times week, pilates, ellyptical and weights, yoga     Seat Belt Yes     Social History Narrative       Review of Systems:  Skin:  Negative       Eyes:  Positive for glasses reading  ENT:  Negative      Respiratory:  Positive for shortness of breath;sleep apnea on occas. ,  AGUSTINA - when lays on back -  uses a mouth guard    Cardiovascular:    Positive for;palpitations;fatigue;lightheadedness;dizziness no palppitations since Tues. ED,   Gastroenterology: Positive for heartburn occ heartburn takes tums   Genitourinary:  Negative      Musculoskeletal:  Positive for nocturnal cramping some cramps  Neurologic:  Positive for numbness or tingling of hands tingling / numb in left hand a little   Psychiatric:  Positive for excessive stress;anxiety stress due to work,    Heme/Lymph/Imm:  Negative      Endocrine:  Negative        Physical Exam:  Vitals: /74 (BP Location: Right arm, Patient Position: Sitting, Cuff Size: Adult Large)  Pulse 72  Ht 1.93 m (6' 4\")  Wt 88.5 kg (195 lb)  BMI 23.74 kg/m2    Constitutional:  cooperative;in no acute distress        Skin:  warm and dry to the touch          Head:  normocephalic        Eyes:  pupils equal and round;sclera white        Lymph:No Cervical lymphadenopathy present     ENT:  no pallor or cyanosis        Neck:  JVP normal        Respiratory:  clear to auscultation         Cardiac: regular rhythm;normal S1 and S2                pulses full and equal                                        GI:  abdomen soft        Extremities and Muscular Skeletal:  no edema              Neurological:  no gross motor deficits;affect appropriate        Psych:  Alert and Oriented x 3        CC  Saleem Gonzalez MD  [facility]  [address] [place] [address] [address]                Thank you for allowing me to participate in the care of your patient.      Sincerely,     Marcellus Upton MD     [facility]    cc:   Saleem Gonzalez MD  [facility]  [address] [place] [address]

## 2018-05-14 NOTE — MR AVS SNAPSHOT
After Visit Summary   5/14/2018    Colby Marx    MRN: [id number]           Patient Information     Date Of Birth          1965        Visit Information        Provider Department      5/14/2018 2:45 PM Marcellus Upton MD [facility]        Today's Diagnoses     NSTEMI (non-ST elevated myocardial infarction) (H)    -  1    Paroxysmal atrial fibrillation (H)           Follow-ups after your visit        Additional Services     Follow-Up with Cardiac Advanced Practice Provider                 Your next 10 appointments already scheduled     May 29, 2018  7:30 AM CDT   Ech Complete with [address] ([facility])    [address] [place] [address] [address]  [facility] [id number] [phone]           1. Please bring or wear a comfortable two-piece outfit. 2. You may eat, drink and take your normal medicines. 3. For any questions that cannot be answered, please contact the ordering physician ***Please check-in at the [facility] Registration Office located in [address] in the [facility] building. When you are finished registering, please go to [address] and have a seat. The technician will call your name for the test.            May 31, 2018  3:30 PM CDT   Return Visit with Ewelina Frankel PA-C   [facility] ([facility])    [address] [place] [address] [address]  [facility] [address]   [phone]            Aug 06, 2018  1:30 PM CDT   Ech Complete with [address] ([facility])    [address] [place] [id number] [phone]           1. Please bring or wear a comfortable two-piece outfit. 2. You may eat, drink and take your normal medicines. 3. For any questions that cannot be answered, please contact the ordering physician ***Please check-in at the [facility]  "Registration Office located in [address] in the [facility] building. When you are finished registering, please go to [address] and have a seat. The technician will call your name for the test.            Aug 06, 2018  4:15 PM CDT   Return Visit with Marcellus Upton MD   [facility] ([facility])    [address] [facility] [address]  [facility] [address]   [phone]              Future tests that were ordered for you today     Open Future Orders        Priority Expected Expires Ordered    Follow-Up with Cardiac Advanced Practice Provider Routine 5/30/2018 5/14/2019 5/14/2018    Echocardiogram Routine 5/21/2018 5/14/2019 5/14/2018            Who to contact     If you have questions or need follow up information about today's clinic visit or your schedule please contact [facility] directly at [phone].  Normal or non-critical lab and imaging results will be communicated to you by Veeboxhart, letter or phone within 4 business days after the clinic has received the results. If you do not hear from us within 7 days, please contact the clinic through Veeboxhart or phone. If you have a critical or abnormal lab result, we will notify you by phone as soon as possible.  Submit refill requests through [employer] or call your pharmacy and they will forward the refill request to us. Please allow 3 business days for your refill to be completed.          Additional Information About Your Visit        [employer] Secure Information     [employer] lets you send messages to your doctor, view your test results, renew your prescriptions, schedule appointments and more. To sign up, go to [email or web address] . Click on \"Log in\" on the left side of the screen, which will take you to the Welcome page. Then click on \"Sign up Now\" on the right side of the page.     You will be asked to enter the access code listed below, as well as some personal " "information. Please follow the directions to create your username and password.     Your access code is: [email or web address]  Expires: 2018  8:33 PM     Your access code will  in 90 days. If you need help or a new code, please call your [place] clinic or [phone].        Care EveryWhere ID     This is your Care EveryWhere ID. This could be used by other organizations to access your [place] medical records  [id number]        Your Vitals Were     Pulse Height BMI (Body Mass Index)             72 1.93 m (6' 4\") 23.74 kg/m2          Blood Pressure from Last 3 Encounters:   18 102/74   18 119/90   17 104/68    Weight from Last 3 Encounters:   18 88.5 kg (195 lb)   18 85.7 kg (189 lb)   17 88.7 kg (195 lb 9.6 oz)                 Today's Medication Changes          These changes are accurate as of 18  3:39 PM.  If you have any questions, ask your nurse or doctor.               Start taking these medicines.        Dose/Directions    apixaban ANTICOAGULANT 5 MG tablet   Commonly known as:  ELIQUIS   Used for:  Paroxysmal atrial fibrillation (H)   Started by:  Marcellus Upton MD        Dose:  5 mg   Take 1 tablet (5 mg) by mouth 2 times daily   Quantity:  60 tablet   Refills:  11         Stop taking these medicines if you haven't already. Please contact your care team if you have questions.     aspirin 81 MG EC tablet   Stopped by:  Marcellus Upton MD           COENZYME Q-10 PO   Stopped by:  Marcellus Upton MD           fish oil-omega-3 fatty acids 1000 MG capsule   Stopped by:  Marcellus Upton MD           GLUCOSAMINE CHOND COMPLEX/MSM Tabs   Stopped by:  Marcellus Upton MD           HYALURONIC ACID PO   Stopped by:  Marcellus Upton MD           MENS PROSTATE HEALTH FORMULA PO   Stopped by:  Marcellus Upton MD           MISC NATURAL PRODUCTS PO   Stopped by:  Marcellus Upton MD           SUPER GRAPEFRUIT N FIBER DIET PO   Stopped by:  Marcellus Upton MD                Where " to get your medicines      Call your pharmacy to confirm that your medication is ready for pickup. It may take up to 24 hours for them to receive the prescription. If the prescription is not ready within 3 business days, please contact your clinic or your provider.     We will let you know when these medications are ready. If you don't hear back within 3 business days, please contact us.     apixaban ANTICOAGULANT 5 MG tablet                Primary Care Provider Office Phone # Fax #    Saleem Gonzalez [phone]       [facility] [address] [place] [address]        Equal Access to Services     [facility]: Hadii aad ku hadasho Soomaali, waaxda luqadaha, qaybta kaalmada adeegyada, waxay devendra hayebonyn adeabby kee . So [facility] [phone].    ATENCIÓN: Si habla español, tiene a ramsey disposición servicios gratuitos de asistencia lingüística. [facility] [phone].    We comply with applicable federal civil rights laws and [place] laws. We do not discriminate on the basis of race, color, national origin, age, disability, sex, sexual orientation, or gender identity.            Thank you!     Thank you for choosing [facility]  for your care. Our goal is always to provide you with excellent care. Hearing back from our patients is one way we can continue to improve our services. Please take a few minutes to complete the written survey that you may receive in the mail after your visit with us. Thank you!             Your Updated Medication List - Protect others around you: Learn how to safely use, store and throw away your medicines at www.disposemymeds.org.          This list is accurate as of 5/14/18  3:39 PM.  Always use your most recent med list.                   Brand Name Dispense Instructions for use Diagnosis    apixaban ANTICOAGULANT 5 MG tablet    ELIQUIS    60 tablet    Take 1 tablet (5 mg) by mouth 2 times daily     Paroxysmal atrial fibrillation (H)       GINKOBA PO      Take 120 mg by mouth daily        multivitamin, therapeutic with minerals Tabs tablet      Take 1 tablet by mouth daily Men's Multi Vitamin        NONFORMULARY      Nioxin Hair Replacement Drops Rub into bald spot on head twice daily.        NONFORMULARY      Take by mouth daily CoCumerin, Electrolyte Replacement, 5 Hour Energy Beverage        PROBIOTIC DAILY PO      Take 2 tablets by mouth daily        STATIN NOT PRESCRIBED (INTENTIONAL)      Statin not prescribed intentionally due to normal coronary arteries and does not have significant elevated LDL.        VALTREX PO      Take 500 mg by mouth daily as needed (Mouth sores)        Vitamin D-3 1000 units Caps      Take by mouth 3 times daily

## 2018-05-14 NOTE — LETTER
5/14/2018      Saleem Gonzalez MD  [facility] [address] [place] [address]      RE: Colby VAUGHAN        Dear Colleague,    I had the pleasure of seeing oClby Marx in the [facility].    Service Date: 05/14/2018      HISTORY OF PRESENT ILLNESS:  Mr. Marx is a pleasant 52-year-old gentleman with a history of a non-ST segment elevation MI in 08/2015 for which he went on to cardiac catheterization and was found to have normal coronary arteries.  I sent the patient for cardiac MRI, which detailed a prior inferior infarction with a calculated ejection fraction of 53%.  On delayed enhancement imaging, there was a subendocardial area of hyper-enhancement involving 51%-75% of the basal inferior wall and was consistent with a small myocardial infarction in the right coronary artery.  The patient did have a history of SCAD in his sister, and that was entertained in this gentleman but was not seen on his cardiac catheterization.  I did have the patient wear a monitor, which showed no evidence for paroxysmal atrial fibrillation.  The patient returns in close followup as he has recently been having palpitations.  He was seen in, I believe, the emergency room and had an ECG performed showing atrial fibrillation with a controlled ventricular response.  The patient states that he has been more tired at work due to excessive stress.  He has been feeling more fatigued overall.  For the last several weeks, he has been noticing palpitations.  Interestingly, the palpitations have since resolved, and he wonders if he is back in sinus rhythm.      Please see my separate note with his full physical examination.      IMPRESSION AND PLAN:  Mr. Marx is a pleasant 52-year-old gentleman with a prior inferior infarction and cardiac catheterization showing normal coronary arteries who has developed new onset paroxysmal atrial fibrillation.  I suspect that the patient has had undocumented  paroxysmal atrial fibrillation for some time and the etiology of his MI was a thromboembolic event.  This places the patient at a higher risk for a recurrent thromboembolic event, and at this time we had a long discussion about continuing antiplatelet therapy with aspirin versus anticoagulation.  After discussing the risks and benefits of each approach, the patient has elected to proceed with anticoagulation.    I have asked him to stop his aspirin and start Eliquis 5 mg twice a day.  I will ask him to undergo a transthoracic echocardiogram given his increased fatigue to reevaluate his ejection fraction.  He will follow back with Ewelina Frankel after this test has been completed.  As long as his EF remains in the 50%-55% range, then no further testing would be warranted.  As the patient had a controlled ventricular response when he was in atrial fibrillation, I would not recommend a beta blocker.  He was counseled in the future to contact the Cardiology Clinic should he have recurrent palpitations, and if he does develop recurrent atrial fibrillation, I would plan on referring him to EP Cardiology for consideration of an ablation.         ATUL GUERIN MD             D: 2018   T: 2018   MT: JOSEPH      Name:     ARLENE NERI   MRN:      [id number]        Account:      [id number]   :      1965           Service Date: 2018      Document: [id number]         Outpatient Encounter Prescriptions as of 2018   Medication Sig Dispense Refill     apixaban ANTICOAGULANT (ELIQUIS) 5 MG tablet Take 1 tablet (5 mg) by mouth 2 times daily 60 tablet 11     Cholecalciferol (VITAMIN D-3) 1000 units CAPS Take by mouth 3 times daily       Ginkgo Biloba (GINKOBA PO) Take 120 mg by mouth daily       multivitamin, therapeutic with minerals (THERA-VIT-M) TABS Take 1 tablet by mouth daily Men's Multi Vitamin       NONFORMULARY Nioxin Hair Replacement Drops  Rub into bald spot on head twice daily.        NONFORMULARY Take by mouth daily CoCumerin, Electrolyte Replacement, 5 Hour Energy Beverage       Probiotic Product (PROBIOTIC DAILY PO) Take 2 tablets by mouth daily       ValACYclovir HCl (VALTREX PO) Take 500 mg by mouth daily as needed (Mouth sores)       STATIN NOT PRESCRIBED, INTENTIONAL, Statin not prescribed intentionally due to normal coronary arteries and does not have significant elevated LDL.       [DISCONTINUED] aspirin EC 81 MG EC tablet Take 1 tablet (81 mg) by mouth daily       [DISCONTINUED] COENZYME Q-10 PO Take 100 mg by mouth daily       [DISCONTINUED] fish oil-omega-3 fatty acids 1000 MG capsule Take 1 g by mouth 3 times daily       [DISCONTINUED] Hyaluronic Acid-Vitamin C (HYALURONIC ACID PO) Take by mouth 3 times daily       [DISCONTINUED] MAGNESIUM OXIDE PO Take 350 mg by mouth daily       [DISCONTINUED] Misc Natural Products (GLUCOSAMINE CHOND COMPLEX/MSM) TABS Take by mouth 3 times daily       [DISCONTINUED] Misc Natural Products (MENS PROSTATE HEALTH FORMULA PO) Take 2 tablets by mouth daily       [DISCONTINUED] Misc Natural Products (SUPER GRAPEFRUIT N FIBER DIET PO) Take by mouth 3 times daily       [DISCONTINUED] MISC NATURAL PRODUCTS PO Take 2 tablets by mouth 3 times daily Zyflamend       [DISCONTINUED] NONFORMULARY 4 tablets daily [place] CORD:USE [employer] (Nutritional Supplement)       No facility-administered encounter medications on file as of 5/14/2018.        Again, thank you for allowing me to participate in the care of your patient.      Sincerely,    Marcellus Upton MD     [facility]

## 2018-05-14 NOTE — PROGRESS NOTES
HPI and Plan:   See dictation    Orders Placed This Encounter   Procedures     Follow-Up with Cardiac Advanced Practice Provider     Echocardiogram       Orders Placed This Encounter   Medications     DISCONTD: Misc Natural Products (GLUCOSAMINE CHOND COMPLEX/MSM) TABS     Sig: Take by mouth 3 times daily     DISCONTD: Hyaluronic Acid-Vitamin C (HYALURONIC ACID PO)     Sig: Take by mouth 3 times daily     DISCONTD: fish oil-omega-3 fatty acids 1000 MG capsule     Sig: Take 1 g by mouth 3 times daily     Cholecalciferol (VITAMIN D-3) 1000 units CAPS     Sig: Take by mouth 3 times daily     DISCONTD: Misc Natural Products (SUPER GRAPEFRUIT N FIBER DIET PO)     Sig: Take by mouth 3 times daily     DISCONTD: MISC NATURAL PRODUCTS PO     Sig: Take 2 tablets by mouth 3 times daily Zyflamend     apixaban ANTICOAGULANT (ELIQUIS) 5 MG tablet     Sig: Take 1 tablet (5 mg) by mouth 2 times daily     Dispense:  60 tablet     Refill:  11       Medications Discontinued During This Encounter   Medication Reason     MAGNESIUM OXIDE PO Stopped by Patient     NONFORMULARY Not filled/taken by Patient     Misc Natural Products (SUPER GRAPEFRUIT N FIBER DIET PO)      COENZYME Q-10 PO      fish oil-omega-3 fatty acids 1000 MG capsule      Hyaluronic Acid-Vitamin C (HYALURONIC ACID PO)      Misc Natural Products (GLUCOSAMINE CHOND COMPLEX/MSM) TABS      Misc Natural Products (MENS PROSTATE HEALTH FORMULA PO)      MISC NATURAL PRODUCTS PO      aspirin EC 81 MG EC tablet          Encounter Diagnoses   Name Primary?     NSTEMI (non-ST elevated myocardial infarction) (H) Yes     Paroxysmal atrial fibrillation (H)        CURRENT MEDICATIONS:  Current Outpatient Prescriptions   Medication Sig Dispense Refill     apixaban ANTICOAGULANT (ELIQUIS) 5 MG tablet Take 1 tablet (5 mg) by mouth 2 times daily 60 tablet 11     Cholecalciferol (VITAMIN D-3) 1000 units CAPS Take by mouth 3 times daily       Ginkgo Biloba (GINKOBA PO) Take 120 mg by mouth  daily       multivitamin, therapeutic with minerals (THERA-VIT-M) TABS Take 1 tablet by mouth daily Men's Multi Vitamin       NONFORMULARY Nioxin Hair Replacement Drops  Rub into bald spot on head twice daily.       NONFORMULARY Take by mouth daily CoCumerin, Electrolyte Replacement, 5 Hour Energy Beverage       Probiotic Product (PROBIOTIC DAILY PO) Take 2 tablets by mouth daily       ValACYclovir HCl (VALTREX PO) Take 500 mg by mouth daily as needed (Mouth sores)       STATIN NOT PRESCRIBED, INTENTIONAL, Statin not prescribed intentionally due to normal coronary arteries and does not have significant elevated LDL.         ALLERGIES   No Known Allergies    PAST MEDICAL HISTORY:  Past Medical History:   Diagnosis Date     Family history of abdominal aortic aneurysm      Family history of coronary artery disease     sister had SCAD episode     Gastro-oesophageal reflux disease      NSTEMI (non-ST elevated myocardial infarction) (H)     Aug 2015, cardiac cath: normal coronaries     Sleep apnea        PAST SURGICAL HISTORY:  No past surgical history on file.    FAMILY HISTORY:  Family History   Problem Relation Age of Onset     Coronary Artery Disease Mother 80     CABG     Coronary Artery Disease Father 56     MI     Coronary Artery Disease Sister 59     MI     Arrhythmia Brother      Coronary Artery Disease Sister      No Known Problems Sister        SOCIAL HISTORY:  Social History     Social History     Marital status: Single     Spouse name: N/A     Number of children: N/A     Years of education: N/A     Social History Main Topics     Smoking status: Never Smoker     Smokeless tobacco: Never Used     Alcohol use Yes      Comment: rare     Drug use: No     Sexual activity: Not Asked     Other Topics Concern     Parent/Sibling W/ Cabg, Mi Or Angioplasty Before 65f 55m? Yes     Caffeine Concern No     Special Diet Yes     Gluten and dairy free     Exercise Yes     2-3 times week, pilates, ellyptical and weights, yoga  "    Seat Belt Yes     Social History Narrative       Review of Systems:  Skin:  Negative       Eyes:  Positive for glasses reading  ENT:  Negative      Respiratory:  Positive for shortness of breath;sleep apnea on occas. ,  AGUSTINA - when lays on back -  uses a mouth guard    Cardiovascular:    Positive for;palpitations;fatigue;lightheadedness;dizziness no palppitations since Tues. ED,   Gastroenterology: Positive for heartburn occ heartburn takes tums   Genitourinary:  Negative      Musculoskeletal:  Positive for nocturnal cramping some cramps  Neurologic:  Positive for numbness or tingling of hands tingling / numb in left hand a little   Psychiatric:  Positive for excessive stress;anxiety stress due to work,    Heme/Lymph/Imm:  Negative      Endocrine:  Negative        Physical Exam:  Vitals: /74 (BP Location: Right arm, Patient Position: Sitting, Cuff Size: Adult Large)  Pulse 72  Ht 1.93 m (6' 4\")  Wt 88.5 kg (195 lb)  BMI 23.74 kg/m2    Constitutional:  cooperative;in no acute distress        Skin:  warm and dry to the touch          Head:  normocephalic        Eyes:  pupils equal and round;sclera white        Lymph:No Cervical lymphadenopathy present     ENT:  no pallor or cyanosis        Neck:  JVP normal        Respiratory:  clear to auscultation         Cardiac: regular rhythm;normal S1 and S2                pulses full and equal                                        GI:  abdomen soft        Extremities and Muscular Skeletal:  no edema              Neurological:  no gross motor deficits;affect appropriate        Psych:  Alert and Oriented x 3        CC  Saleem Gonzalez MD  [facility]  [address] [place] [address] [place] [address]              "

## 2018-05-15 NOTE — PROGRESS NOTES
Service Date: 05/14/2018      HISTORY OF PRESENT ILLNESS:  Mr. Marx is a pleasant 52-year-old gentleman with a history of a non-ST segment elevation MI in 08/2015 for which he went on to cardiac catheterization and was found to have normal coronary arteries.  I sent the patient for cardiac MRI, which detailed a prior inferior infarction with a calculated ejection fraction of 53%.  On delayed enhancement imaging, there was a subendocardial area of hyper-enhancement involving 51%-75% of the basal inferior wall and was consistent with a small myocardial infarction in the right coronary artery.  The patient did have a history of SCAD in his sister, and that was entertained in this gentleman but was not seen on his cardiac catheterization.  I did have the patient wear a monitor, which showed no evidence for paroxysmal atrial fibrillation.  The patient returns in close followup as he has recently been having palpitations.  He was seen in, I believe, the emergency room and had an ECG performed showing atrial fibrillation with a controlled ventricular response.  The patient states that he has been more tired at work due to excessive stress.  He has been feeling more fatigued overall.  For the last several weeks, he has been noticing palpitations.  Interestingly, the palpitations have since resolved, and he wonders if he is back in sinus rhythm.      Please see my separate note with his full physical examination.      IMPRESSION AND PLAN:  Mr. Marx is a pleasant 52-year-old gentleman with a prior inferior infarction and cardiac catheterization showing normal coronary arteries who has developed new onset paroxysmal atrial fibrillation.  I suspect that the patient has had undocumented paroxysmal atrial fibrillation for some time and the etiology of his MI was a thromboembolic event.  This places the patient at a higher risk for a recurrent thromboembolic event, and at this time we had a long discussion about continuing  antiplatelet therapy with aspirin versus anticoagulation.  After discussing the risks and benefits of each approach, the patient has elected to proceed with anticoagulation.  I have asked him to stop his aspirin and start Eliquis 5 mg twice a day.  I will ask him to undergo a transthoracic echocardiogram given his increased fatigue to reevaluate his ejection fraction.  He will follow back with Ewelina Frankel after this test has been completed.  As long as his EF remains in the 50%-55% range, then no further testing would be warranted.  As the patient had a controlled ventricular response when he was in atrial fibrillation, I would not recommend a beta blocker.  He was counseled in the future to contact the Cardiology Clinic should he have recurrent palpitations, and if he does develop recurrent atrial fibrillation, I would plan on referring him to EP Cardiology for consideration of an ablation.         ATUL GUERIN MD             D: 2018   T: 2018   MT: JOSEPH      Name:     ARLENE NERI   MRN:      [id number]        Account:      [id number]   :      1965           Service Date: 2018      Document: [id number]

## 2018-05-29 ENCOUNTER — TELEPHONE (OUTPATIENT)
Dept: CARDIOLOGY | Facility: CLINIC | Age: 53
End: 2018-05-29

## 2018-05-29 ENCOUNTER — HOSPITAL ENCOUNTER (OUTPATIENT)
Dept: CARDIOLOGY | Facility: CLINIC | Age: 53
Discharge: HOME | End: 2018-05-29
Attending: INTERNAL MEDICINE | Admitting: INTERNAL MEDICINE
Payer: COMMERCIAL

## 2018-05-29 DIAGNOSIS — I21.4 NSTEMI (NON-ST ELEVATED MYOCARDIAL INFARCTION) (H): Primary | ICD-10-CM

## 2018-05-29 DIAGNOSIS — I48.0 PAROXYSMAL ATRIAL FIBRILLATION (H): ICD-10-CM

## 2018-05-29 PROCEDURE — 93306 TTE W/DOPPLER COMPLETE: CPT

## 2018-05-29 PROCEDURE — 93306 TTE W/DOPPLER COMPLETE: CPT | Mod: 26 | Performed by: INTERNAL MEDICINE

## 2018-05-29 NOTE — TELEPHONE ENCOUNTER
"Called patient with Echo results from 5/28/18. Results reviewed by Ewelina Frankel PA-C. Pt had OV on 5/31/18 to review results but no further recommendations made based on results. Pt is asymptomatic and denies increased palpitations. RN number given to pt, will call if needs earlier appt. Orders in for annual visit placed.  All questions answered.      \"The rhythm was sinus bradycardia.  Left ventricular systolic function is normal.  The visual ejection fraction is estimated at 55-60%.  The left ventricle is normal in size.  The right ventricle is normal in structure, function and size.  Doppler interrogation does not demonstrate significant stenosis or  insufficiency involving cardiac valves.\"    STACY Espitia    "

## 2018-10-03 ENCOUNTER — TELEPHONE (OUTPATIENT)
Dept: CARDIOLOGY | Facility: CLINIC | Age: 53
End: 2018-10-03

## 2018-10-03 NOTE — LETTER
[facility]  [address]  [facility] [address]  Phone: [phone]  Fax: [phone]       Patient information:   Colby Marx   1965  [address] [place] [address]    Cardiologist: Alexsandra Germain MD      To whom it may concern:    A request was received asking approval for a 3 day hold of Eliquis prior to scheduled surgery on 18.  has reviewed and approves a 3 day hold prior to surgery.          Should you have any questions, please call [phone]    Brittany KUMAR   [facility]

## 2018-10-03 NOTE — TELEPHONE ENCOUNTER
Received call from pt stating he is having shoulder surgery 11/26/18. Physician is requesting a hold on Eliquis. Advised pt to ask for a formal request be faxed to us.  will review and respond after that. Pt understands. Fax and number given to patient. STACY Espitia

## 2018-10-05 NOTE — TELEPHONE ENCOUNTER
"Received letter from [facility].  is requesting a 3 day hold of Eliquis prior to surgery on 11/26/18.   Right elbow common flexor tendon repair scheduled on 11/26/18.     Per last OV w/  on 5/14/18, \"Hx of prior inferior infarction and cath showing normal coronary arteries who has developed new onset paroxysmal afib. I suspect that the pt has had undocumented paroxysmal afib for some time and the etiology of his MI was a thromboembolic event. This places the pt at a higher risk for a recurrent thromboembolic event, and at this time we had a long discussion about continuing antiplatelet therapy with ASA vs anticoagulation. After discussing the risks and benefits of each approach, the pt has elected to proceed with anticoagulation. I have asked him to stop his aspirin and start Eliquis 5 mg twice a day\"    Routing to  for review. STACY Espitia    "

## 2018-10-08 NOTE — TELEPHONE ENCOUNTER
Letter sent to 's office at [facility]. Left a message with RN. Also let pt know of approval for Silvia gonzalez. STACY Espitia

## 2019-04-30 ENCOUNTER — TELEPHONE (OUTPATIENT)
Dept: CARDIOLOGY | Facility: CLINIC | Age: 54
End: 2019-04-30

## 2019-04-30 NOTE — TELEPHONE ENCOUNTER
PA Initiation    Medication: apixaban ANTICOAGULANT (ELIQUIS) 5 MG tablet -   Insurance Company: [employer] - Phone [phone] Fax [phone]  Pharmacy Filling the Rx: [facility] - [place], [place] - [address] AT [facility] [place] [address]  Filling Pharmacy Phone: [phone]  Filling Pharmacy Fax: [phone]  Start Date: 4/30/2019

## 2019-04-30 NOTE — TELEPHONE ENCOUNTER
Prior Authorization Approval    Authorization Effective Date: 3/31/2019  Authorization Expiration Date: 4/29/2020  Medication: apixaban ANTICOAGULANT (ELIQUIS) 5 MG tablet - APPROVED  Approved Dose/Quantity:   Reference #: [id number]   Insurance Company: [employer] - Phone [phone] Fax [phone]  Expected CoPay:       CoPay Card Available:      Foundation Assistance Needed:    Which Pharmacy is filling the prescription (Not needed for infusion/clinic administered): [facility] [address] [place] [address] [place] [place] [address] [facility] [place] [address]  Pharmacy Notified: Yes  Patient Notified: Comment:  **Instructed pharmacy to notify patient when script is ready to /ship.**

## 2019-05-30 ENCOUNTER — OFFICE VISIT (OUTPATIENT)
Dept: CARDIOLOGY | Facility: CLINIC | Age: 54
End: 2019-05-30
Attending: INTERNAL MEDICINE
Payer: COMMERCIAL

## 2019-05-30 VITALS
BODY MASS INDEX: 23.9 KG/M2 | WEIGHT: 196.3 LBS | SYSTOLIC BLOOD PRESSURE: 109 MMHG | HEIGHT: 76 IN | DIASTOLIC BLOOD PRESSURE: 71 MMHG | HEART RATE: 58 BPM

## 2019-05-30 DIAGNOSIS — G47.33 OBSTRUCTIVE SLEEP APNEA SYNDROME: ICD-10-CM

## 2019-05-30 DIAGNOSIS — I48.0 PAROXYSMAL ATRIAL FIBRILLATION (H): ICD-10-CM

## 2019-05-30 DIAGNOSIS — I21.4 NSTEMI (NON-ST ELEVATED MYOCARDIAL INFARCTION) (H): Primary | ICD-10-CM

## 2019-05-30 PROCEDURE — 99214 OFFICE O/P EST MOD 30 MIN: CPT | Performed by: INTERNAL MEDICINE

## 2019-05-30 ASSESSMENT — MIFFLIN-ST. JEOR: SCORE: 1826.91

## 2019-05-30 NOTE — LETTER
5/30/2019    Jagdeep Jennings MD  [facility] [address]  [place] [address]    RE: Colby Marx       Dear Colleague,    I had the pleasure of seeing Colby Marx in the [facility].    HPI and Plan:   See dictation:[id number]    Orders Placed This Encounter   Procedures     Follow-Up with Cardiologist       No orders of the defined types were placed in this encounter.      Medications Discontinued During This Encounter   Medication Reason     Cholecalciferol (VITAMIN D-3) 1000 units CAPS Stopped by Patient     NONFORMULARY Stopped by Patient         Encounter Diagnoses   Name Primary?     NSTEMI (non-ST elevated myocardial infarction) (H) Yes     Paroxysmal atrial fibrillation (H)        CURRENT MEDICATIONS:  Current Outpatient Medications   Medication Sig Dispense Refill     apixaban ANTICOAGULANT (ELIQUIS) 5 MG tablet Take 1 tablet (5 mg) by mouth 2 times daily 60 tablet 11     Ginkgo Biloba (GINKOBA PO) Take 120 mg by mouth daily       multivitamin, therapeutic with minerals (THERA-VIT-M) TABS Take 1 tablet by mouth daily Men's Multi Vitamin       NONFORMULARY Nioxin Hair Replacement Drops  Rub into bald spot on head twice daily.       Probiotic Product (PROBIOTIC DAILY PO) Take 2 tablets by mouth daily       ValACYclovir HCl (VALTREX PO) Take 500 mg by mouth daily        STATIN NOT PRESCRIBED, INTENTIONAL, Statin not prescribed intentionally due to normal coronary arteries and does not have significant elevated LDL.         ALLERGIES   No Known Allergies    PAST MEDICAL HISTORY:  Past Medical History:   Diagnosis Date     Family history of abdominal aortic aneurysm      Family history of coronary artery disease     sister had SCAD episode     Fatigue      Gastro-oesophageal reflux disease      NSTEMI (non-ST elevated myocardial infarction) (H)     Aug 2015, cardiac cath: normal coronaries     Paroxysmal atrial fibrillation (H)      Sleep apnea        PAST  SURGICAL HISTORY:  History reviewed. No pertinent surgical history.    FAMILY HISTORY:  Family History   Problem Relation Age of Onset     Coronary Artery Disease Mother 80        CABG     Coronary Artery Disease Father 56        MI     Coronary Artery Disease Sister 59        MI     Arrhythmia Brother      Coronary Artery Disease Sister      No Known Problems Sister        SOCIAL HISTORY:  Social History     Socioeconomic History     Marital status: Single     Spouse name: None     Number of children: None     Years of education: None     Highest education level: None   Occupational History     None   Social Needs     Financial resource strain: None     Food insecurity:     Worry: None     Inability: None     Transportation needs:     Medical: None     Non-medical: None   Tobacco Use     Smoking status: Never Smoker     Smokeless tobacco: Never Used   Substance and Sexual Activity     Alcohol use: Yes     Comment: rare     Drug use: No     Sexual activity: None   Lifestyle     Physical activity:     Days per week: None     Minutes per session: None     Stress: None   Relationships     Social connections:     Talks on phone: None     Gets together: None     Attends Pentecostalism service: None     Active member of club or organization: None     Attends meetings of clubs or organizations: None     Relationship status: None     Intimate partner violence:     Fear of current or ex partner: None     Emotionally abused: None     Physically abused: None     Forced sexual activity: None   Other Topics Concern     Parent/sibling w/ CABG, MI or angioplasty before 65F 55M? Yes      Service Not Asked     Blood Transfusions Not Asked     Caffeine Concern No     Occupational Exposure Not Asked     Hobby Hazards Not Asked     Sleep Concern Not Asked     Stress Concern Not Asked     Weight Concern Not Asked     Special Diet Yes     Comment: Gluten and dairy free     Back Care Not Asked     Exercise Yes     Comment: 2-3 times  "week, pilates, ellyptical and weights, yoga     Bike Helmet Not Asked     Seat Belt Yes     Self-Exams Not Asked   Social History Narrative     None       Review of Systems:  Skin:  Negative       Eyes:  Positive for glasses reading  ENT:  Negative      Respiratory:  Negative       Cardiovascular:    Positive for;palpitations    Gastroenterology: Positive for heartburn    Genitourinary:  Negative      Musculoskeletal:  Negative      Neurologic:  Positive for numbness or tingling of hands    Psychiatric:  Positive for excessive stress    Heme/Lymph/Imm:  Negative      Endocrine:  Negative        Physical Exam:  Vitals: /71   Pulse 58   Ht 1.93 m (6' 4\")   Wt 89 kg (196 lb 4.8 oz)   BMI 23.89 kg/m       Constitutional:  cooperative, alert and oriented, well developed, well nourished, in no acute distress        Skin:  warm and dry to the touch, no apparent skin lesions or masses noted          Head:  normocephalic, no masses or lesions        Eyes:  pupils equal and round;sclera white;conjunctivae and lids unremarkable;EOMS intact;no xanthalasma        Lymph:No Cervical lymphadenopathy present     ENT:  no pallor or cyanosis, dentition good        Neck:  carotid pulses are full and equal bilaterally, JVP normal, no carotid bruit        Respiratory:  normal breath sounds, clear to auscultation, normal A-P diameter, normal symmetry, normal respiratory excursion, no use of accessory muscles         Cardiac: regular rhythm, normal S1/S2, no S3 or S4, apical impulse not displaced, no murmurs, gallops or rubs                pulses full and equal, no bruits auscultated                                        GI:  abdomen soft, non-tender, BS normoactive, no mass, no HSM, no bruits        Extremities and Muscular Skeletal:  no edema;no deformities, clubbing, cyanosis, erythema observed              Neurological:  no gross motor deficits;affect appropriate        Psych:  Alert and Oriented x 3        CC  Marcellus " MD Alexsandra  No address on file                Thank you for allowing me to participate in the care of your patient.      Sincerely,     Scott Shah MD     [facility]    cc:   Marcellus Upton MD  No address on file

## 2019-05-30 NOTE — PROGRESS NOTES
Service Date: 05/30/2019      PRIMARY CARE PHYSICIAN:  Dr. Jagdeep Jennings.      HISTORY OF PRESENT ILLNESS:  I had the pleasure of seeing your patient, Colby Marx, at [facility] for evaluation of previous non-ST elevation myocardial infarction and paroxysmal atrial fibrillation.  This is my first visit with this patient, who followed with Dr. Upton, who has since left our clinic.  The patient suffered a non-ST elevation myocardial infarction in 08/2015 with cardiac catheterization showing normal coronary arteries.  A cardiac MRI detailed a prior inferior infarction with a calculated ejection fraction of 53%.  There was a subendocardial area of hyper-enhancement involving 51%-75% of the basal inferior wall consistent with a small myocardial infarction in the right coronary artery distribution.  The patient's sister does have a history of SCAD.  This was not seen during the patient's heart catheterization.  The patient wore a monitor which showed no evidence of paroxysmal atrial fibrillation.  The patient presented in 2018 for palpitations.  He had had palpitations over many years.  He was seen in the emergency room and found to be in atrial fibrillation.  This had gone on for approximately 1 hour and spontaneously resolved.  It is now felt that this patient likely had a paroxysmal atrial fibrillation with a thromboembolic MI.  He notes this occurs 2-3 times per year and lasts only briefly.  A decision was made last year to transition this patient from aspirin to Eliquis.  He denies any bleeding diathesis.  He continues to exercise 2-3 times per week with yoga, elliptical cross- and stair climber.  He denies angina or shortness of breath.  He finds it difficult to jog because of right hip pain.  He is status post right elbow surgery in 11/2018.  He denies chest pain or shortness of breath.  The patient continues to have significant work stress.  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  He has some regional travel.  He is planning shelter at age 55.      PHYSICAL EXAMINATION:  As listed below.      ASSESSMENT/PLAN:   1.  Colby Marx is a delightful 53-year-old male with a prior inferior wall myocardial infarction and normal coronary arteries on heart catheterization.  Approximately 1 year ago, he developed paroxysmal atrial fibrillation.  It is suspected that he had nondocumented paroxysmal atrial fibrillation for some time and this is the etiology of his non-ST elevation MI due to a thromboembolic event.  He is now on Eliquis 5 mg twice a day without bleeding diathesis.  He has had no further events.  It is my intention to see this patient again in 1 year.  I have asked him to continue with his exercise.  A transthoracic echocardiogram 1 year ago was normal with ejection fraction of 60% without regional wall motion abnormalities.  No significant valvular abnormalities.  The patient does not have a known coagulation issue.  Should he have recurrence of his atrial fibrillation, medical therapy is possible as is referral to EP Cardiology for possible ablation.  The patient understands all of these features.  We have discussed all of this at length.  He will continue to exercise.  He will call for protracted atrial fibrillation of up to 12 hours.      It is my pleasure to assist in the care of Colby Marx.  I will see him in 1 year.  All his questions were answered to his satisfaction.      Franky Barillas MD      cc:   Jagdeep Jennings MD   [facility] [address]   [place] [address]         FRANKY BARILLAS MD, FACC             D: 2019   T: 2019   MT: al      Name:     COLBY MARX   MRN:      [id number]        Account:      [id number]   :      1965           Service Date: 2019      Document: [id number]

## 2019-05-30 NOTE — PROGRESS NOTES
HPI and Plan:   See dictation:[id number]    Orders Placed This Encounter   Procedures     Follow-Up with Cardiologist       No orders of the defined types were placed in this encounter.      Medications Discontinued During This Encounter   Medication Reason     Cholecalciferol (VITAMIN D-3) 1000 units CAPS Stopped by Patient     NONFORMULARY Stopped by Patient         Encounter Diagnoses   Name Primary?     NSTEMI (non-ST elevated myocardial infarction) (H) Yes     Paroxysmal atrial fibrillation (H)        CURRENT MEDICATIONS:  Current Outpatient Medications   Medication Sig Dispense Refill     apixaban ANTICOAGULANT (ELIQUIS) 5 MG tablet Take 1 tablet (5 mg) by mouth 2 times daily 60 tablet 11     Ginkgo Biloba (GINKOBA PO) Take 120 mg by mouth daily       multivitamin, therapeutic with minerals (THERA-VIT-M) TABS Take 1 tablet by mouth daily Men's Multi Vitamin       NONFORMULARY Nioxin Hair Replacement Drops  Rub into bald spot on head twice daily.       Probiotic Product (PROBIOTIC DAILY PO) Take 2 tablets by mouth daily       ValACYclovir HCl (VALTREX PO) Take 500 mg by mouth daily        STATIN NOT PRESCRIBED, INTENTIONAL, Statin not prescribed intentionally due to normal coronary arteries and does not have significant elevated LDL.         ALLERGIES   No Known Allergies    PAST MEDICAL HISTORY:  Past Medical History:   Diagnosis Date     Family history of abdominal aortic aneurysm      Family history of coronary artery disease     sister had SCAD episode     Fatigue      Gastro-oesophageal reflux disease      NSTEMI (non-ST elevated myocardial infarction) (H)     Aug 2015, cardiac cath: normal coronaries     Paroxysmal atrial fibrillation (H)      Sleep apnea        PAST SURGICAL HISTORY:  History reviewed. No pertinent surgical history.    FAMILY HISTORY:  Family History   Problem Relation Age of Onset     Coronary Artery Disease Mother 80        CABG     Coronary Artery Disease Father 56        MI     Coronary  Artery Disease Sister 59        MI     Arrhythmia Brother      Coronary Artery Disease Sister      No Known Problems Sister        SOCIAL HISTORY:  Social History     Socioeconomic History     Marital status: Single     Spouse name: None     Number of children: None     Years of education: None     Highest education level: None   Occupational History     None   Social Needs     Financial resource strain: None     Food insecurity:     Worry: None     Inability: None     Transportation needs:     Medical: None     Non-medical: None   Tobacco Use     Smoking status: Never Smoker     Smokeless tobacco: Never Used   Substance and Sexual Activity     Alcohol use: Yes     Comment: rare     Drug use: No     Sexual activity: None   Lifestyle     Physical activity:     Days per week: None     Minutes per session: None     Stress: None   Relationships     Social connections:     Talks on phone: None     Gets together: None     Attends Spiritism service: None     Active member of club or organization: None     Attends meetings of clubs or organizations: None     Relationship status: None     Intimate partner violence:     Fear of current or ex partner: None     Emotionally abused: None     Physically abused: None     Forced sexual activity: None   Other Topics Concern     Parent/sibling w/ CABG, MI or angioplasty before 65F 55M? Yes      Service Not Asked     Blood Transfusions Not Asked     Caffeine Concern No     Occupational Exposure Not Asked     Hobby Hazards Not Asked     Sleep Concern Not Asked     Stress Concern Not Asked     Weight Concern Not Asked     Special Diet Yes     Comment: Gluten and dairy free     Back Care Not Asked     Exercise Yes     Comment: 2-3 times week, pilates, ellyptical and weights, yoga     Bike Helmet Not Asked     Seat Belt Yes     Self-Exams Not Asked   Social History Narrative     None       Review of Systems:  Skin:  Negative       Eyes:  Positive for glasses reading  ENT:  Negative  "     Respiratory:  Negative       Cardiovascular:    Positive for;palpitations    Gastroenterology: Positive for heartburn    Genitourinary:  Negative      Musculoskeletal:  Negative      Neurologic:  Positive for numbness or tingling of hands    Psychiatric:  Positive for excessive stress    Heme/Lymph/Imm:  Negative      Endocrine:  Negative        Physical Exam:  Vitals: /71   Pulse 58   Ht 1.93 m (6' 4\")   Wt 89 kg (196 lb 4.8 oz)   BMI 23.89 kg/m      Constitutional:  cooperative, alert and oriented, well developed, well nourished, in no acute distress        Skin:  warm and dry to the touch, no apparent skin lesions or masses noted          Head:  normocephalic, no masses or lesions        Eyes:  pupils equal and round;sclera white;conjunctivae and lids unremarkable;EOMS intact;no xanthalasma        Lymph:No Cervical lymphadenopathy present     ENT:  no pallor or cyanosis, dentition good        Neck:  carotid pulses are full and equal bilaterally, JVP normal, no carotid bruit        Respiratory:  normal breath sounds, clear to auscultation, normal A-P diameter, normal symmetry, normal respiratory excursion, no use of accessory muscles         Cardiac: regular rhythm, normal S1/S2, no S3 or S4, apical impulse not displaced, no murmurs, gallops or rubs                pulses full and equal, no bruits auscultated                                        GI:  abdomen soft, non-tender, BS normoactive, no mass, no HSM, no bruits        Extremities and Muscular Skeletal:  no edema;no deformities, clubbing, cyanosis, erythema observed              Neurological:  no gross motor deficits;affect appropriate        Psych:  Alert and Oriented x 3        CC  Marcellus Upton MD  No address on file              "

## 2019-05-30 NOTE — LETTER
5/30/2019      Jagdeep Jennings MD  [facility] [address]  [place] [address]      RE: Colby Marx       Dear Colleague,    I had the pleasure of seeing Colby Marx in the [facility].    Service Date: 05/30/2019      PRIMARY CARE PHYSICIAN:  Dr. Jagdeep Jennings.      HISTORY OF PRESENT ILLNESS:  I had the pleasure of seeing your patient, Colby Marx, at [facility] for evaluation of previous non-ST elevation myocardial infarction and paroxysmal atrial fibrillation.  This is my first visit with this patient, who followed with Dr. Upton, who has since left our clinic.  The patient suffered a non-ST elevation myocardial infarction in 08/2015 with cardiac catheterization showing normal coronary arteries.  A cardiac MRI detailed a prior inferior infarction with a calculated ejection fraction of 53%.  There was a subendocardial area of hyper-enhancement involving 51%-75% of the basal inferior wall consistent with a small myocardial infarction in the right coronary artery distribution.  The patient's sister does have a history of SCAD.  This was not seen during the patient's heart catheterization.  The patient wore a monitor which showed no evidence of paroxysmal atrial fibrillation.  The patient presented in 2018 for palpitations.  He had had palpitations over many years.  He was seen in the emergency room and found to be in atrial fibrillation.  This had gone on for approximately 1 hour and spontaneously resolved.  It is now felt that this patient likely had a paroxysmal atrial fibrillation with a thromboembolic MI.  He notes this occurs 2-3 times per year and lasts only briefly.  A decision was made last year to transition this patient from aspirin to Eliquis.  He denies any bleeding diathesis.  He continues to exercise 2-3 times per week with yoga, elliptical cross- and stair climber.  He denies angina or shortness of breath.  He  finds it difficult to jog because of right hip pain.  He is status post right elbow surgery in 11/2018.  He denies chest pain or shortness of breath.  The patient continues to have significant work stress.  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  He has some regional travel.  He is planning [facility] at age 55.      PHYSICAL EXAMINATION:  As listed below.      ASSESSMENT/PLAN:   1.  Colby Marx is a delightful 53-year-old male with a prior inferior wall myocardial infarction and normal coronary arteries on heart catheterization.  Approximately 1 year ago, he developed paroxysmal atrial fibrillation.  It is suspected that he had nondocumented paroxysmal atrial fibrillation for some time and this is the etiology of his non-ST elevation MI due to a thromboembolic event.  He is now on Eliquis 5 mg twice a day without bleeding diathesis.  He has had no further events.  It is my intention to see this patient again in 1 year.  I have asked him to continue with his exercise.  A transthoracic echocardiogram 1 year ago was normal with ejection fraction of 60% without regional wall motion abnormalities.  No significant valvular abnormalities.  The patient does not have a known coagulation issue.  Should he have recurrence of his atrial fibrillation, medical therapy is possible as is referral to EP Cardiology for possible ablation.  The patient understands all of these features.  We have discussed all of this at length.  He will continue to exercise.  He will call for protracted atrial fibrillation of up to 12 hours.      It is my pleasure to assist in the care of Colby Marx.  I will see him in 1 year.  All his questions were answered to his satisfaction.      Franky Barillas MD      cc:   Jagdeep Jennings MD   [facility]   [address]   [place] [address]         FRANKY BARILLAS MD, FACC             D: 05/30/2019   T: 05/30/2019   MT: al      Name:     COLBY MARX   MRN:       [id number]        Account:      [id number]   :      1965           Service Date: 2019      Document: [id number]           Outpatient Encounter Medications as of 2019   Medication Sig Dispense Refill     apixaban ANTICOAGULANT (ELIQUIS) 5 MG tablet Take 1 tablet (5 mg) by mouth 2 times daily 60 tablet 11     Ginkgo Biloba (GINKOBA PO) Take 120 mg by mouth daily       multivitamin, therapeutic with minerals (THERA-VIT-M) TABS Take 1 tablet by mouth daily Men's Multi Vitamin       NONFORMULARY Nioxin Hair Replacement Drops  Rub into bald spot on head twice daily.       Probiotic Product (PROBIOTIC DAILY PO) Take 2 tablets by mouth daily       ValACYclovir HCl (VALTREX PO) Take 500 mg by mouth daily        STATIN NOT PRESCRIBED, INTENTIONAL, Statin not prescribed intentionally due to normal coronary arteries and does not have significant elevated LDL.       [DISCONTINUED] Cholecalciferol (VITAMIN D-3) 1000 units CAPS Take by mouth 3 times daily       [DISCONTINUED] NONFORMULARY Take by mouth daily CoCumerin, Electrolyte Replacement, 5 Hour Energy Beverage       No facility-administered encounter medications on file as of 2019.        Again, thank you for allowing me to participate in the care of your patient.      Sincerely,    Scott Shah MD     [facility]

## 2019-06-12 DIAGNOSIS — I48.0 PAROXYSMAL ATRIAL FIBRILLATION (H): ICD-10-CM

## 2020-06-04 ENCOUNTER — TELEPHONE (OUTPATIENT)
Dept: CARDIOLOGY | Facility: CLINIC | Age: 55
End: 2020-06-04

## 2020-06-04 DIAGNOSIS — I48.0 PAROXYSMAL ATRIAL FIBRILLATION (H): ICD-10-CM

## 2020-06-04 NOTE — TELEPHONE ENCOUNTER
VM received from patient, stating that he needs a refill of his eliquis. Patient also states that he forgot his eliquis this weekend as he is out of town and needs a refill sent in locally where he is. Patient states that he has a physical and follow up with testing at [facility], so he is wondering if he needs to even follow up with our clinic. Spoke with patient. Patient states that he would like to continue follow up here. Rx for 90 days escripted to patient's preferred pharmacy in [place]. Message sent to scheduling to schedule OV for patient.

## 2020-06-25 ENCOUNTER — VIRTUAL VISIT (OUTPATIENT)
Dept: CARDIOLOGY | Facility: CLINIC | Age: 55
End: 2020-06-25
Payer: COMMERCIAL

## 2020-06-25 DIAGNOSIS — I21.4 NSTEMI (NON-ST ELEVATED MYOCARDIAL INFARCTION) (H): Primary | ICD-10-CM

## 2020-06-25 PROCEDURE — 99213 OFFICE O/P EST LOW 20 MIN: CPT | Mod: 95 | Performed by: INTERNAL MEDICINE

## 2020-06-25 RX ORDER — CALCIUM CARBONATE 500 MG/1
1 TABLET, CHEWABLE ORAL PRN
COMMUNITY
End: 2024-03-06

## 2020-06-25 NOTE — LETTER
6/25/2020    Jagdeep Michaela, DO  [facility] [address] [place] [address]    RE: Colby Marx       Dear Colleague,    I had the pleasure of seeing Colby Marx in the [facility].    Colby Marx is a 54 year old male who is being evaluated via a billable telephone visit.      Very pleasant 54-year-old gentleman who I am following for non-ST elevation MI and paroxysmal atrial fibrillation.    The MI occurred in 2015.  He was under a lot of stress at that time.  Coronary arteries were normal as was his LVEF.  However MRI showed evidence of a prior inferior infarction.  He was later diagnosed with paroxysmal atrial fibrillation and it is presumed that he had thromboembolic episode from PAF leading to the infarction.  He has since been on Eliquis which he tolerates well.  He bruises easily but there has been no significant bleeding.    He had an executive physical at the [facility].  He saw Dr. Kaushal Mackenzie in cardiology who is in agreement with diagnosis and treatment plan.  He had stress testing as well as rhythm monitoring and he was told both of these are normal.  Currently he is doing fine, he is physically active and I congratulated him on his recent penitentiary.    I renewed his Eliquis and look forward to seeing him again in a years time for further follow-up.    Phone call duration: 11 minutes    Thank you for allowing me to participate in the care of your patient.    Sincerely,     DR HILARIO HAYWOOD MD     [facility]

## 2020-06-25 NOTE — PROGRESS NOTES
"Colby Marx is a 54 year old male who is being evaluated via a billable telephone visit.      The patient has been notified of following:     \"This telephone visit will be conducted via a call between you and your physician/provider. We have found that certain health care needs can be provided without the need for a physical exam.  This service lets us provide the care you need with a short phone conversation.  If a prescription is necessary we can send it directly to your pharmacy.  If lab work is needed we can place an order for that and you can then stop by our lab to have the test done at a later time.    Telephone visits are billed at different rates depending on your insurance coverage. During this emergency period, for some insurers they may be billed the same as an in-person visit.  Please reach out to your insurance provider with any questions.    If during the course of the call the physician/provider feels a telephone visit is not appropriate, you will not be charged for this service.\"    Patient has given verbal consent for Telephone visit?  Yes    What phone number would you like to be contacted at? [phone]    How would you like to obtain your AVS? Mail copy    VITALS:  Weight- 191.2 lbs  BP- 110/72  Pulse- 60    Review Of Systems:  Skin: NEGATIVE  Eyes:Ears/Nose/Throat: NEGATIVE  Respiratory: Sleep apnea  Cardiovascular:NEGATIVE  Gastrointestinal: Occasional heartburn  Genitourinary:NEGATIVE  Musculoskeletal: NEGATIVE  Neurologic: NEGATIVE  Psychiatric: NEGATIVE  Hematologic/Lymphatic/Immunologic: NEGATIVE  Endocrine:  NEGATIVE    ANU Quezada    Very pleasant 54-year-old gentleman who I am following for non-ST elevation MI and paroxysmal atrial fibrillation.    The MI occurred in 2015.  He was under a lot of stress at that time.  Coronary arteries were normal as was his LVEF.  However MRI showed evidence of a prior inferior infarction.  He was later diagnosed with paroxysmal atrial " fibrillation and it is presumed that he had thromboembolic episode from PAF leading to the infarction.  He has since been on Eliquis which he tolerates well.  He bruises easily but there has been no significant bleeding.    He had an executive physical at the [facility].  He saw Dr. Kaushal Mackenzie in cardiology who is in agreement with diagnosis and treatment plan.  He had stress testing as well as rhythm monitoring and he was told both of these are normal.  Currently he is doing fine, he is physically active and I congratulated him on his recent MCC.    I renewed his Eliquis and look forward to seeing him again in a years time for further follow-up.    Phone call duration: 11 minutes    DR HILARIO HAYWOOD MD

## 2020-08-24 DIAGNOSIS — I48.0 PAROXYSMAL ATRIAL FIBRILLATION (H): ICD-10-CM

## 2020-08-25 ENCOUNTER — TELEPHONE (OUTPATIENT)
Dept: CARDIOLOGY | Facility: CLINIC | Age: 55
End: 2020-08-25

## 2020-08-25 NOTE — TELEPHONE ENCOUNTER
PA Initiation    Medication: Eliquis 5MG -   Insurance Company: [employer] - Phone [phone] Fax [phone]  Pharmacy Filling the Rx: [id number] - [place] - [address] AT [facility] OF [place] [address]  Filling Pharmacy Phone: [phone]  Filling Pharmacy Fax: [phone]  Start Date: 8/25/2020

## 2020-08-25 NOTE — TELEPHONE ENCOUNTER
Prior Authorization Approval    Medication: Eliquis 5MG - APPROVED was approved on 8/25/2020  Effective: 7/26/2020 to 8/25/2021  Reference #: [id number]  Approved Dose/Quantity:   Insurance Company: [employer] - Phone [phone] Fax [phone]  Expected CoPay:    Pharmacy Filling the Rx: [facility] #[id number] - [place], [place] - [address] AT [facility] OF [address]  Pharmacy Notified: Yes  Patient Notified: Comment:  **Instructed pharmacy to notify patient when script is ready to /ship.**

## 2020-08-25 NOTE — TELEPHONE ENCOUNTER
Prior Authorization Retail Medication Request    Medication/Dose: eliquis  ICD code (if different than what is on RX):  MI,paf  Previously Tried and Failed:  Stable on current medication  Rationale:  MRI showed evidence of a prior inferior infarction.  He was later diagnosed with paroxysmal atrial fibrillation and it is presumed that he had thromboembolic episode from PAF leading to the infarction.  He has since been on Eliquis which he tolerates well.  He bruises easily but there has been no significant bleeding.    Insurance Name:  [facility]  Insurance ID:  [id number]      Pharmacy Information (if different than what is on RX)  Name:  [address]  Phone:

## 2021-04-13 DIAGNOSIS — I48.0 PAROXYSMAL ATRIAL FIBRILLATION (H): ICD-10-CM

## 2021-07-23 ENCOUNTER — APPOINTMENT (OUTPATIENT)
Dept: GENERAL RADIOLOGY | Facility: CLINIC | Age: 56
End: 2021-07-23
Attending: EMERGENCY MEDICINE
Payer: COMMERCIAL

## 2021-07-23 ENCOUNTER — HOSPITAL ENCOUNTER (EMERGENCY)
Dept: CARDIOLOGY | Facility: CLINIC | Age: 56
End: 2021-07-23
Attending: EMERGENCY MEDICINE
Payer: COMMERCIAL

## 2021-07-23 ENCOUNTER — HOSPITAL ENCOUNTER (EMERGENCY)
Facility: CLINIC | Age: 56
Discharge: HOME | End: 2021-07-23
Attending: EMERGENCY MEDICINE | Admitting: EMERGENCY MEDICINE
Payer: COMMERCIAL

## 2021-07-23 ENCOUNTER — NURSE TRIAGE (OUTPATIENT)
Dept: CARDIOLOGY | Facility: CLINIC | Age: 56
End: 2021-07-23

## 2021-07-23 VITALS
WEIGHT: 202 LBS | OXYGEN SATURATION: 98 % | BODY MASS INDEX: 24.59 KG/M2 | RESPIRATION RATE: 12 BRPM | SYSTOLIC BLOOD PRESSURE: 105 MMHG | DIASTOLIC BLOOD PRESSURE: 75 MMHG | TEMPERATURE: 97.6 F | HEART RATE: 71 BPM

## 2021-07-23 DIAGNOSIS — R00.2 PALPITATIONS: Primary | ICD-10-CM

## 2021-07-23 DIAGNOSIS — R00.2 PALPITATIONS: ICD-10-CM

## 2021-07-23 DIAGNOSIS — R07.89 CHEST PRESSURE: ICD-10-CM

## 2021-07-23 LAB
ANION GAP SERPL CALCULATED.3IONS-SCNC: 1 MMOL/L (ref 3–14)
BASOPHILS # BLD AUTO: 0 10E3/UL (ref 0–0.2)
BASOPHILS NFR BLD AUTO: 0 %
BUN SERPL-MCNC: 24 MG/DL (ref 7–30)
CALCIUM SERPL-MCNC: 9.1 MG/DL (ref 8.5–10.1)
CHLORIDE BLD-SCNC: 108 MMOL/L (ref 94–109)
CO2 SERPL-SCNC: 31 MMOL/L (ref 20–32)
CREAT SERPL-MCNC: 1.26 MG/DL (ref 0.66–1.25)
EOSINOPHIL # BLD AUTO: 0 10E3/UL (ref 0–0.7)
EOSINOPHIL NFR BLD AUTO: 1 %
ERYTHROCYTE [DISTWIDTH] IN BLOOD BY AUTOMATED COUNT: 12.2 % (ref 10–15)
GFR SERPL CREATININE-BSD FRML MDRD: 62 ML/MIN/1.73M2
GLUCOSE BLD-MCNC: 114 MG/DL (ref 70–99)
HCT VFR BLD AUTO: 43.7 % (ref 40–53)
HGB BLD-MCNC: 14.6 G/DL (ref 13.3–17.7)
IMM GRANULOCYTES # BLD: 0 10E3/UL
IMM GRANULOCYTES NFR BLD: 0 %
LYMPHOCYTES # BLD AUTO: 1.6 10E3/UL (ref 0.8–5.3)
LYMPHOCYTES NFR BLD AUTO: 31 %
MAGNESIUM SERPL-MCNC: 2 MG/DL (ref 1.6–2.3)
MCH RBC QN AUTO: 31.9 PG (ref 26.5–33)
MCHC RBC AUTO-ENTMCNC: 33.4 G/DL (ref 31.5–36.5)
MCV RBC AUTO: 96 FL (ref 78–100)
MONOCYTES # BLD AUTO: 0.5 10E3/UL (ref 0–1.3)
MONOCYTES NFR BLD AUTO: 10 %
NEUTROPHILS # BLD AUTO: 3.1 10E3/UL (ref 1.6–8.3)
NEUTROPHILS NFR BLD AUTO: 58 %
NRBC # BLD AUTO: 0 10E3/UL
NRBC BLD AUTO-RTO: 0 /100
PLATELET # BLD AUTO: 194 10E3/UL (ref 150–450)
POTASSIUM BLD-SCNC: 3.9 MMOL/L (ref 3.4–5.3)
RBC # BLD AUTO: 4.57 10E6/UL (ref 4.4–5.9)
SARS-COV-2 RNA RESP QL NAA+PROBE: NEGATIVE
SODIUM SERPL-SCNC: 140 MMOL/L (ref 133–144)
TROPONIN I SERPL-MCNC: <0.015 UG/L (ref 0–0.04)
TSH SERPL DL<=0.005 MIU/L-ACNC: 1.19 MU/L (ref 0.4–4)
WBC # BLD AUTO: 5.4 10E3/UL (ref 4–11)

## 2021-07-23 PROCEDURE — C9803 HOPD COVID-19 SPEC COLLECT: HCPCS

## 2021-07-23 PROCEDURE — 80048 BASIC METABOLIC PNL TOTAL CA: CPT | Performed by: EMERGENCY MEDICINE

## 2021-07-23 PROCEDURE — 83735 ASSAY OF MAGNESIUM: CPT | Performed by: EMERGENCY MEDICINE

## 2021-07-23 PROCEDURE — 99284 EMERGENCY DEPT VISIT MOD MDM: CPT | Mod: 25

## 2021-07-23 PROCEDURE — 258N000003 HC RX IP 258 OP 636: Performed by: EMERGENCY MEDICINE

## 2021-07-23 PROCEDURE — 93005 ELECTROCARDIOGRAM TRACING: CPT

## 2021-07-23 PROCEDURE — 36415 COLL VENOUS BLD VENIPUNCTURE: CPT | Performed by: EMERGENCY MEDICINE

## 2021-07-23 PROCEDURE — 87635 SARS-COV-2 COVID-19 AMP PRB: CPT | Performed by: EMERGENCY MEDICINE

## 2021-07-23 PROCEDURE — 71045 X-RAY EXAM CHEST 1 VIEW: CPT

## 2021-07-23 PROCEDURE — 84443 ASSAY THYROID STIM HORMONE: CPT | Performed by: EMERGENCY MEDICINE

## 2021-07-23 PROCEDURE — 93227 XTRNL ECG REC<48 HR R&I: CPT | Performed by: INTERNAL MEDICINE

## 2021-07-23 PROCEDURE — 85025 COMPLETE CBC W/AUTO DIFF WBC: CPT | Performed by: EMERGENCY MEDICINE

## 2021-07-23 PROCEDURE — 93225 XTRNL ECG REC<48 HRS REC: CPT

## 2021-07-23 PROCEDURE — 84484 ASSAY OF TROPONIN QUANT: CPT | Performed by: EMERGENCY MEDICINE

## 2021-07-23 RX ADMIN — SODIUM CHLORIDE 1000 ML: 9 INJECTION, SOLUTION INTRAVENOUS at 14:40

## 2021-07-23 ASSESSMENT — ENCOUNTER SYMPTOMS
NAUSEA: 0
DIZZINESS: 0
ABDOMINAL PAIN: 0
COUGH: 1
VOMITING: 0
CHILLS: 0
PALPITATIONS: 1
BLOOD IN STOOL: 0
SHORTNESS OF BREATH: 1
FEVER: 0

## 2021-07-23 NOTE — DISCHARGE INSTRUCTIONS
"Isolate until your COVID-19 test result returns.  You can access it your \"[facility] MY Chart\"  "

## 2021-07-23 NOTE — ED TRIAGE NOTES
Pt reports a history of a-fib, states he feels he has been in a-fib frequently for the last 2-3 weeks.  Pt also reports upper chest pressure for the last 2-3 weeks.

## 2021-07-23 NOTE — TELEPHONE ENCOUNTER
"Call transferred from [place] in scheduling to discuss symptoms.   Spoke to Colby who says he has been experiencing intermittent chest pain \"fluttering\" with pressure in his throat, \"a little shortness of breath\", some lightheadedness, and just not feeling very well for about 2 weeks. He says he believes he is in atrial fib but is not aware of any possible triggers. He says his heart rate is irregular but has not counted it but says it is not going fast to his knowledge. He says he had a MI in the past that was associated with paroxysmal atrial fib so is concerned about this even though he is on a blood thinner. He says he is not on a beta blocker. Advised he go to the ER for an evaluation. He verbalized agreement and understanding to go to the ER today.  "

## 2021-07-23 NOTE — ED PROVIDER NOTES
"  History   Chief Complaint:  Palpitations        HPI   Colby Marx is a 56 year old male with history of GERD, NSTEMI, and paroxysmal atrial fibrillation who presents with palpitations. For the past 2-3 weeks, the patient reports that he has been experiencing intermittent episodes of palpitations that he states feels similar to his previous episodes of atrial fibrillation. During these episodes, he also states that he experiencing shortness of breath and a mild chest pain that he describes as a \"pressure.\" In addition, he states that he has woken up the past few nights with a cough. He reports that these episodes have become more frequent over the past few days, prompting him to present to the emergency department. He denies any fever, chills, abdominal pain, nausea, vomiting, bloody stools, leg swelling, dizziness, or any other symptoms. Of note, he reports that he has an appointment soon to follow up with Dr. Perea, Cardiology.    Review of Systems   Constitutional: Negative for chills and fever.   Respiratory: Positive for cough and shortness of breath.    Cardiovascular: Positive for chest pain and palpitations. Negative for leg swelling.   Gastrointestinal: Negative for abdominal pain, blood in stool, nausea and vomiting.   Neurological: Negative for dizziness.   All other systems reviewed and are negative.    Allergies:  The patient has no known allergies.     Medications:  Eliquis  Valtrex    Past Medical History:    GERD  NSTEMI  Paroxysmal atrial fibrillation  Sleep apnea    Basal cell carcinoma of skin    Past Surgical History:    Inguinal hernia repair     Family History:    CAD - mother, father  TIA - mother  MI - father, sister    Social History:  PCP: Jagdeep Jennings  Presents to the ED alone.    Physical Exam     Patient Vitals for the past 24 hrs:   BP Temp Temp src Pulse Resp SpO2 Weight   07/23/21 1500 -- -- -- 71 12 99 % --   07/23/21 1445 -- -- -- 59 10 97 % --   07/23/21 1430 -- -- -- 63 15 99 % -- "   07/23/21 1415 105/75 -- -- 65 8 99 % --   07/23/21 1354 103/71 97.6  F (36.4  C) Temporal 70 16 100 % 91.6 kg (202 lb)       Physical Exam  Nursing note and vitals reviewed.  Constitutional:  Appears well-developed and well-nourished.   HENT:   Head:    Atraumatic.   Mouth/Throat:   Oropharynx is clear and moist. No oropharyngeal exudate.   Eyes:    Pupils are equal, round, and reactive to light.   Neck:    Normal range of motion. Neck supple.      No tracheal deviation present. No thyromegaly present.   Cardiovascular:  Normal rate, regular rhythm, no murmur   Pulmonary/Chest: Breath sounds are clear and equal without wheezes or crackles.  Abdominal:   Soft. Bowel sounds are normal. Exhibits no distension and      no mass. There is no tenderness.      There is no rebound and no guarding.   Musculoskeletal:  Exhibits no edema.   Lymphadenopathy:  No cervical adenopathy.   Neurological:   Alert and oriented to person, place, and time.   Skin:    Skin is warm and dry. No rash noted. No pallor.     Emergency Department Course   ECG  ECG taken at 1352, ECG read at 1400  Normal sinus rhythm. Rightward axis. Borderline ECG.  Rate 64 bpm. DE interval 188 ms. QRS duration 92 ms. QT/QTc 380/392 ms. P-R-T axes 85 91 71.     Imaging:    XR Chest Portable 1 View:   No airspace consolidation, pneumothorax, or pleural   effusion. As per radiology.     Laboratory:     CBC: WBC 5.4, HGB 14.6,     BMP: Anion Gap 1 (L), Creatinine 1.26 (H), Glucose 114 (H), o/w WNL     Troponin (Collected 1413): <0.015    TSH with free T4 reflex: 1.19    Magnesium: 2.0    Symptomatic Influenza A/B & SARS-CoV2 (COVID19) Virus PCR Multiplex: Pending    Emergency Department Course:    Reviewed:  I reviewed nursing notes, vitals, past medical history and care everywhere.    Assessments:  1403 I obtained history and examined the patient as noted above.     1500 I rechecked the patient and explained findings.     Interventions:  1440 NS, 1 L,  IV    Disposition:  The patient was discharged to home.       Impression & Plan     Medical Decision Making:  I found the patient to have acute palpitations with associated chest discomfort which is likely due to recurrent bouts of atrial fibrillation since he has a history of this. His symptoms are not exertional and his troponin is normal with no acute ischemic changes to his ECG so I feel as though he can be safely discharged home. I do not feel there is any concern for pulmonary embolism as he is anticoagulated with Eliquis. There was no sign of acute heart failure, hypothyroidism, electrolyte abnormality, or anemia. His Covid-19 test is pending and he was told to isolate until this results, which he can check through [employer]. I ordered a 48 hour Holter monitor for him and ordered an outpatient stress echo. He will follow up with electrophysiology/cardiology for his appointment and was told to return as needed for any worsening symptoms.      Covid-19  Colby Marx was evaluated during a global COVID-19 pandemic, which necessitated consideration that the patient might be at risk for infection with the SARS-CoV-2 virus that causes COVID-19.   Applicable protocols for evaluation were followed during the patient's care.   COVID-19 was considered as part of the patient's evaluation. The plan for testing is:  a test was obtained during this visit.    Diagnosis:    ICD-10-CM    1. Palpitations  R00.2 Holter Monitor 48 hour Adult Pediatric     Echo Stress Echocardiogram   2. Chest pressure  R07.89 Echo Stress Echocardiogram       Scribe Disclosure:  I, Michael Edison, am serving as a scribe at 2:00 PM on 7/23/2021 to document services personally performed by Yuliet Phoenix MD based on my observations and the provider's statements to me.            Yuliet Phoenix MD  07/23/21 9767

## 2021-07-24 LAB
ATRIAL RATE - MUSE: 64 BPM
DIASTOLIC BLOOD PRESSURE - MUSE: NORMAL MMHG
INTERPRETATION ECG - MUSE: NORMAL
P AXIS - MUSE: 85 DEGREES
PR INTERVAL - MUSE: 188 MS
QRS DURATION - MUSE: 92 MS
QT - MUSE: 380 MS
QTC - MUSE: 392 MS
R AXIS - MUSE: 91 DEGREES
SYSTOLIC BLOOD PRESSURE - MUSE: NORMAL MMHG
T AXIS - MUSE: 71 DEGREES
VENTRICULAR RATE- MUSE: 64 BPM

## 2021-08-10 ENCOUNTER — OFFICE VISIT (OUTPATIENT)
Dept: CARDIOLOGY | Facility: CLINIC | Age: 56
End: 2021-08-10
Payer: COMMERCIAL

## 2021-08-10 VITALS
HEART RATE: 57 BPM | SYSTOLIC BLOOD PRESSURE: 123 MMHG | WEIGHT: 207 LBS | HEIGHT: 76 IN | BODY MASS INDEX: 25.21 KG/M2 | DIASTOLIC BLOOD PRESSURE: 77 MMHG

## 2021-08-10 DIAGNOSIS — I48.0 PAROXYSMAL ATRIAL FIBRILLATION (H): Primary | ICD-10-CM

## 2021-08-10 PROCEDURE — 99213 OFFICE O/P EST LOW 20 MIN: CPT | Performed by: INTERNAL MEDICINE

## 2021-08-10 ASSESSMENT — MIFFLIN-ST. JEOR: SCORE: 1860.45

## 2021-08-10 NOTE — PROGRESS NOTES
HPI and Plan:   It is my pleasure to see this very pleasant 56-year-old gentleman for follow-up of non-ST elevation MI and paroxysmal atrial fibrillation.     The MI occurred in 2015.  He was under a lot of stress at that time.  Coronary arteries were normal as was his LVEF.  However MRI showed evidence of a prior inferior infarction.  He was later diagnosed with paroxysmal atrial fibrillation and it is presumed that he had thromboembolic episode from PAF leading to the infarction.  He has since been on Eliquis which he tolerates well.      He was well when I saw him last year.  Generally continues to be the case this year though he experienced more palpitations.  By this he means irregular heartbeat and occasional skipped beat.  He has been under some stress recently.  Fortunately a Holter monitor showed the presence of VPCs only.  This corresponded with his symptoms.  No atrial fibrillation was recorded.    Cardiac examination continues to be normal.    I think he is having symptomatic PVCs rather than atrial fibrillation.  For monitoring at home I recommended either an iWatch 5 or alive cor device.  He will continue with Eliquis which is well-tolerated.  I will see him again in 1 years time for further follow-up.  Orders Placed This Encounter   Procedures     Follow-Up with Cardiologist       No orders of the defined types were placed in this encounter.      Encounter Diagnosis   Name Primary?     Paroxysmal atrial fibrillation (H) Yes       CURRENT MEDICATIONS:  Current Outpatient Medications   Medication Sig Dispense Refill     apixaban ANTICOAGULANT (ELIQUIS) 5 MG tablet Take 1 tablet (5 mg) by mouth 2 times daily 180 tablet 0     calcium carbonate (TUMS) 500 MG chewable tablet Take 1 chew tab by mouth as needed for heartburn       Ginkgo Biloba (GINKOBA PO) Take 120 mg by mouth daily       NONFORMULARY Nioxin Hair Replacement Drops  Rub into bald spot on head twice daily.       Probiotic Product (PROBIOTIC  DAILY PO) Take 2 tablets by mouth daily       ValACYclovir HCl (VALTREX PO) Take 500 mg by mouth daily        Multiple Vitamins-Minerals (VISION VITAMINS PO) Take by mouth 2 times daily (Patient not taking: Reported on 8/10/2021)       multivitamin, therapeutic with minerals (THERA-VIT-M) TABS Take 1 tablet by mouth daily Men's Multi Vitamin (Patient not taking: Reported on 8/10/2021)       Nutritional Supplements (ANTI-INFLAMMATORY ENZYME PO) ZYFLAMEND capsule daily (Patient not taking: Reported on 8/10/2021)       STATIN NOT PRESCRIBED, INTENTIONAL, Statin not prescribed intentionally due to normal coronary arteries and does not have significant elevated LDL. (Patient not taking: Reported on 8/10/2021)         ALLERGIES   No Known Allergies    PAST MEDICAL HISTORY:  Past Medical History:   Diagnosis Date     Family history of abdominal aortic aneurysm      Family history of coronary artery disease     sister had SCAD episode     Fatigue      Gastro-oesophageal reflux disease      NSTEMI (non-ST elevated myocardial infarction) (H)     Aug 2015, cardiac cath: normal coronaries     Paroxysmal atrial fibrillation (H)      Sleep apnea        PAST SURGICAL HISTORY:  No past surgical history on file.    FAMILY HISTORY:  Family History   Problem Relation Age of Onset     Coronary Artery Disease Mother 80        CABG     Cerebrovascular Disease Mother         TIA     Coronary Artery Disease Father 56        MI     Coronary Artery Disease Sister 59        MI     Arrhythmia Brother      Coronary Artery Disease Sister      No Known Problems Sister        SOCIAL HISTORY:  Social History     Socioeconomic History     Marital status: Single     Spouse name: None     Number of children: None     Years of education: None     Highest education level: None   Occupational History     None   Tobacco Use     Smoking status: Never Smoker     Smokeless tobacco: Never Used   Substance and Sexual Activity     Alcohol use: Yes     Comment:  rare     Drug use: No     Sexual activity: None   Other Topics Concern     Parent/sibling w/ CABG, MI or angioplasty before 65F 55M? Yes      Service Not Asked     Blood Transfusions Not Asked     Caffeine Concern No     Occupational Exposure Not Asked     Hobby Hazards Not Asked     Sleep Concern Not Asked     Stress Concern Not Asked     Weight Concern Not Asked     Special Diet Yes     Comment: Gluten and dairy free     Back Care Not Asked     Exercise Yes     Comment: 2-3 times week, pilates, ellyptical and weights, yoga     Bike Helmet Not Asked     Seat Belt Yes     Self-Exams Not Asked   Social History Narrative     None     Social Determinants of Health     Financial Resource Strain:      Difficulty of Paying Living Expenses:    Food Insecurity:      Worried About Running Out of Food in the Last Year:      Ran Out of Food in the Last Year:    Transportation Needs:      Lack of Transportation (Medical):      Lack of Transportation (Non-Medical):    Physical Activity:      Days of Exercise per Week:      Minutes of Exercise per Session:    Stress:      Feeling of Stress :    Social Connections:      Frequency of Communication with Friends and Family:      Frequency of Social Gatherings with Friends and Family:      Attends Christian Services:      Active Member of Clubs or Organizations:      Attends Club or Organization Meetings:      Marital Status:    Intimate Partner Violence:      Fear of Current or Ex-Partner:      Emotionally Abused:      Physically Abused:      Sexually Abused:        Review of Systems:  Skin:  Negative     Eyes:  Negative    ENT:  Negative    Respiratory:  Positive for sleep apnea  Cardiovascular:    Positive for;palpitations  Gastroenterology: Positive for heartburn  Genitourinary:  Negative    Musculoskeletal:  Positive for neck pain;back pain  Neurologic:  Negative    Psychiatric:  Negative    Heme/Lymph/Imm:  Negative    Endocrine:  Negative      Physical Exam:  Vitals: BP  "123/77 (BP Location: Left arm, Cuff Size: Adult Regular)   Pulse 57   Ht 1.93 m (6' 4\")   Wt 93.9 kg (207 lb)   BMI 25.20 kg/m      Constitutional:  cooperative, alert and oriented, well developed, well nourished, in no acute distress        Skin:  warm and dry to the touch, no apparent skin lesions or masses noted          Head:  normocephalic, no masses or lesions        Eyes:           Lymph:No Cervical lymphadenopathy present     ENT:  no pallor or cyanosis, dentition good        Neck:  carotid pulses are full and equal bilaterally, JVP normal, no carotid bruit        Respiratory:  normal breath sounds, clear to auscultation, normal A-P diameter, normal symmetry, normal respiratory excursion, no use of accessory muscles         Cardiac: regular rhythm, normal S1/S2, no S3 or S4, apical impulse not displaced, no murmurs, gallops or rubs                pulses full and equal, no bruits auscultated                                        GI:  abdomen soft, non-tender, BS normoactive, no mass, no HSM, no bruits        Extremities and Muscular Skeletal:  no edema;no deformities, clubbing, cyanosis, erythema observed              Neurological:  no gross motor deficits;affect appropriate        Psych:  Alert and Oriented x 3        Recent Lab Results:  LIPID RESULTS:  Lab Results   Component Value Date    CHOL 124 07/31/2017    HDL 33 07/31/2017    LDL 79 07/31/2017    TRIG 59 07/31/2017       LIVER ENZYME RESULTS:  Lab Results   Component Value Date    AST 23 01/08/2016    ALT 40 01/08/2016       CBC RESULTS:  Lab Results   Component Value Date    WBC 5.4 07/23/2021    WBC 9.0 05/08/2018    RBC 4.57 07/23/2021    RBC 4.58 05/08/2018    HGB 14.6 07/23/2021    HGB 15.2 05/08/2018    HCT 43.7 07/23/2021    HCT 43.9 05/08/2018    MCV 96 07/23/2021    MCV 96 05/08/2018    MCH 31.9 07/23/2021    MCH 33.2 (H) 05/08/2018    MCHC 33.4 07/23/2021    MCHC 34.6 05/08/2018    RDW 12.2 07/23/2021    RDW 12.5 05/08/2018     " 07/23/2021     05/08/2018       BMP RESULTS:  Lab Results   Component Value Date     07/23/2021     05/08/2018    POTASSIUM 3.9 07/23/2021    POTASSIUM 3.6 05/08/2018    CHLORIDE 108 07/23/2021    CHLORIDE 108 05/08/2018    CO2 31 07/23/2021    CO2 27 05/08/2018    ANIONGAP 1 (L) 07/23/2021    ANIONGAP 8 05/08/2018     (H) 07/23/2021     (H) 05/08/2018    BUN 24 07/23/2021    BUN 17 05/08/2018    CR 1.26 (H) 07/23/2021    CR 1.13 05/08/2018    GFRESTIMATED 62 07/23/2021    GFRESTIMATED 67 05/08/2018    GFRESTBLACK 82 05/08/2018    NICOLETTE 9.1 07/23/2021    NICOLETTE 9.1 05/08/2018        A1C RESULTS:  No results found for: A1C    INR RESULTS:  No results found for: INR        CC  No referring provider defined for this encounter.

## 2021-08-10 NOTE — LETTER
8/10/2021    Jagdeepcassandra Jennings   [facility] [address] [place] [address]    RE: Colby Marx       Dear Colleague,    I had the pleasure of seeing Keshawn Sir in the [facility].    HPI and Plan:   It is my pleasure to see this very pleasant 56-year-old gentleman for follow-up of non-ST elevation MI and paroxysmal atrial fibrillation.     The MI occurred in 2015.  He was under a lot of stress at that time.  Coronary arteries were normal as was his LVEF.  However MRI showed evidence of a prior inferior infarction.  He was later diagnosed with paroxysmal atrial fibrillation and it is presumed that he had thromboembolic episode from PAF leading to the infarction.  He has since been on Eliquis which he tolerates well.      He was well when I saw him last year.  Generally continues to be the case this year though he experienced more palpitations.  By this he means irregular heartbeat and occasional skipped beat.  He has been under some stress recently.  Fortunately a Holter monitor showed the presence of VPCs only.  This corresponded with his symptoms.  No atrial fibrillation was recorded.    Cardiac examination continues to be normal.    I think he is having symptomatic PVCs rather than atrial fibrillation.  For monitoring at home I recommended either an iWatch 5 or alive cor device.  He will continue with Eliquis which is well-tolerated.  I will see him again in 1 years time for further follow-up.  Orders Placed This Encounter   Procedures     Follow-Up with Cardiologist       No orders of the defined types were placed in this encounter.      Encounter Diagnosis   Name Primary?     Paroxysmal atrial fibrillation (H) Yes       CURRENT MEDICATIONS:  Current Outpatient Medications   Medication Sig Dispense Refill     apixaban ANTICOAGULANT (ELIQUIS) 5 MG tablet Take 1 tablet (5 mg) by mouth 2 times daily 180 tablet 0     calcium  carbonate (TUMS) 500 MG chewable tablet Take 1 chew tab by mouth as needed for heartburn       Ginkgo Biloba (GINKOBA PO) Take 120 mg by mouth daily       NONFORMULARY Nioxin Hair Replacement Drops  Rub into bald spot on head twice daily.       Probiotic Product (PROBIOTIC DAILY PO) Take 2 tablets by mouth daily       ValACYclovir HCl (VALTREX PO) Take 500 mg by mouth daily        Multiple Vitamins-Minerals (VISION VITAMINS PO) Take by mouth 2 times daily (Patient not taking: Reported on 8/10/2021)       multivitamin, therapeutic with minerals (THERA-VIT-M) TABS Take 1 tablet by mouth daily Men's Multi Vitamin (Patient not taking: Reported on 8/10/2021)       Nutritional Supplements (ANTI-INFLAMMATORY ENZYME PO) ZYFLAMEND capsule daily (Patient not taking: Reported on 8/10/2021)       STATIN NOT PRESCRIBED, INTENTIONAL, Statin not prescribed intentionally due to normal coronary arteries and does not have significant elevated LDL. (Patient not taking: Reported on 8/10/2021)         ALLERGIES   No Known Allergies    PAST MEDICAL HISTORY:  Past Medical History:   Diagnosis Date     Family history of abdominal aortic aneurysm      Family history of coronary artery disease     sister had SCAD episode     Fatigue      Gastro-oesophageal reflux disease      NSTEMI (non-ST elevated myocardial infarction) (H)     Aug 2015, cardiac cath: normal coronaries     Paroxysmal atrial fibrillation (H)      Sleep apnea        PAST SURGICAL HISTORY:  No past surgical history on file.    FAMILY HISTORY:  Family History   Problem Relation Age of Onset     Coronary Artery Disease Mother 80        CABG     Cerebrovascular Disease Mother         TIA     Coronary Artery Disease Father 56        MI     Coronary Artery Disease Sister 59        MI     Arrhythmia Brother      Coronary Artery Disease Sister      No Known Problems Sister        SOCIAL HISTORY:  Social History     Socioeconomic History     Marital status: Single     Spouse name:  None     Number of children: None     Years of education: None     Highest education level: None   Occupational History     None   Tobacco Use     Smoking status: Never Smoker     Smokeless tobacco: Never Used   Substance and Sexual Activity     Alcohol use: Yes     Comment: rare     Drug use: No     Sexual activity: None   Other Topics Concern     Parent/sibling w/ CABG, MI or angioplasty before 65F 55M? Yes      Service Not Asked     Blood Transfusions Not Asked     Caffeine Concern No     Occupational Exposure Not Asked     Hobby Hazards Not Asked     Sleep Concern Not Asked     Stress Concern Not Asked     Weight Concern Not Asked     Special Diet Yes     Comment: Gluten and dairy free     Back Care Not Asked     Exercise Yes     Comment: 2-3 times week, pilates, ellyptical and weights, yoga     Bike Helmet Not Asked     Seat Belt Yes     Self-Exams Not Asked   Social History Narrative     None     Social Determinants of Health     Financial Resource Strain:      Difficulty of Paying Living Expenses:    Food Insecurity:      Worried About Running Out of Food in the Last Year:      Ran Out of Food in the Last Year:    Transportation Needs:      Lack of Transportation (Medical):      Lack of Transportation (Non-Medical):    Physical Activity:      Days of Exercise per Week:      Minutes of Exercise per Session:    Stress:      Feeling of Stress :    Social Connections:      Frequency of Communication with Friends and Family:      Frequency of Social Gatherings with Friends and Family:      Attends [employer] Services:      Active Member of Clubs or Organizations:      Attends Club or Organization Meetings:      Marital Status:    Intimate Partner Violence:      Fear of Current or Ex-Partner:      Emotionally Abused:      Physically Abused:      Sexually Abused:        Review of Systems:  Skin:  Negative     Eyes:  Negative    ENT:  Negative    Respiratory:  Positive for sleep apnea  Cardiovascular:     "Positive for;palpitations  Gastroenterology: Positive for heartburn  Genitourinary:  Negative    Musculoskeletal:  Positive for neck pain;back pain  Neurologic:  Negative    Psychiatric:  Negative    Heme/Lymph/Imm:  Negative    Endocrine:  Negative      Physical Exam:  Vitals: /77 (BP Location: Left arm, Cuff Size: Adult Regular)   Pulse 57   Ht 1.93 m (6' 4\")   Wt 93.9 kg (207 lb)   BMI 25.20 kg/m      Constitutional:  cooperative, alert and oriented, well developed, well nourished, in no acute distress        Skin:  warm and dry to the touch, no apparent skin lesions or masses noted          Head:  normocephalic, no masses or lesions        Eyes:           Lymph:No Cervical lymphadenopathy present     ENT:  no pallor or cyanosis, dentition good        Neck:  carotid pulses are full and equal bilaterally, JVP normal, no carotid bruit        Respiratory:  normal breath sounds, clear to auscultation, normal A-P diameter, normal symmetry, normal respiratory excursion, no use of accessory muscles         Cardiac: regular rhythm, normal S1/S2, no S3 or S4, apical impulse not displaced, no murmurs, gallops or rubs                pulses full and equal, no bruits auscultated                                        GI:  abdomen soft, non-tender, BS normoactive, no mass, no HSM, no bruits        Extremities and Muscular Skeletal:  no edema;no deformities, clubbing, cyanosis, erythema observed              Neurological:  no gross motor deficits;affect appropriate        Psych:  Alert and Oriented x 3        Recent Lab Results:  LIPID RESULTS:  Lab Results   Component Value Date    CHOL 124 07/31/2017    HDL 33 07/31/2017    LDL 79 07/31/2017    TRIG 59 07/31/2017       LIVER ENZYME RESULTS:  Lab Results   Component Value Date    AST 23 01/08/2016    ALT 40 01/08/2016       CBC RESULTS:  Lab Results   Component Value Date    WBC 5.4 07/23/2021    WBC 9.0 05/08/2018    RBC 4.57 07/23/2021    RBC 4.58 05/08/2018    HGB " 14.6 07/23/2021    HGB 15.2 05/08/2018    HCT 43.7 07/23/2021    HCT 43.9 05/08/2018    MCV 96 07/23/2021    MCV 96 05/08/2018    MCH 31.9 07/23/2021    MCH 33.2 (H) 05/08/2018    MCHC 33.4 07/23/2021    MCHC 34.6 05/08/2018    RDW 12.2 07/23/2021    RDW 12.5 05/08/2018     07/23/2021     05/08/2018       BMP RESULTS:  Lab Results   Component Value Date     07/23/2021     05/08/2018    POTASSIUM 3.9 07/23/2021    POTASSIUM 3.6 05/08/2018    CHLORIDE 108 07/23/2021    CHLORIDE 108 05/08/2018    CO2 31 07/23/2021    CO2 27 05/08/2018    ANIONGAP 1 (L) 07/23/2021    ANIONGAP 8 05/08/2018     (H) 07/23/2021     (H) 05/08/2018    BUN 24 07/23/2021    BUN 17 05/08/2018    CR 1.26 (H) 07/23/2021    CR 1.13 05/08/2018    GFRESTIMATED 62 07/23/2021    GFRESTIMATED 67 05/08/2018    GFRESTBLACK 82 05/08/2018    NICOLETTE 9.1 07/23/2021    NICOLETTE 9.1 05/08/2018        A1C RESULTS:  No results found for: A1C    INR RESULTS:  No results found for: INR        CC  No referring provider defined for this encounter.                    Thank you for allowing me to participate in the care of your patient.      Sincerely,     DR HILARIO HAYWOOD MD     [facility]  cc:   No referring provider defined for this encounter.

## 2021-08-26 ENCOUNTER — TELEPHONE (OUTPATIENT)
Dept: CARDIOLOGY | Facility: CLINIC | Age: 56
End: 2021-08-26

## 2021-08-26 NOTE — TELEPHONE ENCOUNTER
[facility] left message stating that patient will be undergoing routine colonoscopy on 9/24 and  requesting a 3 day hold of Eliquis with bridging orders if indicated.    Patient has PAF with a CHADS-Vasc of 1 for CAD-which was due to a thromboembolic event. His EF is normal and last holter done in July 2021 showed no afib. No CVA /TIA noted in history.    Will route to Dr. HAYWOOD for his review and recommendation.

## 2021-08-26 NOTE — TELEPHONE ENCOUNTER
Brit, Herbert Tyson MD  You 14 minutes ago (3:11 PM)   BI  Yes 3 day hold is fine.  Thanks.    Message text          I called [facility] back and left message stating that Dr. Perea approves of their request for a 3 day hold of Eliquis without bridging..

## 2021-10-03 ENCOUNTER — HEALTH MAINTENANCE LETTER (OUTPATIENT)
Age: 56
End: 2021-10-03

## 2022-03-02 ENCOUNTER — TELEPHONE (OUTPATIENT)
Dept: LAB | Facility: CLINIC | Age: 57
End: 2022-03-02
Payer: COMMERCIAL

## 2022-03-02 NOTE — TELEPHONE ENCOUNTER
Reason for Call:  Same Day Appointment, Requested Provider:  Needs covid test for procedure    PCP: Jagdeep Jennings    Reason for visit: needs to be fit in before procedure     Duration of symptoms: n/a    Have you been treated for this in the past? No    Additional comments:    Can we leave a detailed message on this number? YES    Phone number patient can be reached at: Home number on file [phone] (home)    Best Time: anytime    Call taken on 3/2/2022 at 10:53 AM by Sade Orta

## 2022-03-07 ENCOUNTER — OFFICE VISIT (OUTPATIENT)
Dept: FAMILY MEDICINE | Facility: CLINIC | Age: 57
End: 2022-03-07
Payer: COMMERCIAL

## 2022-03-07 VITALS
BODY MASS INDEX: 24.72 KG/M2 | TEMPERATURE: 97.9 F | OXYGEN SATURATION: 98 % | SYSTOLIC BLOOD PRESSURE: 108 MMHG | HEART RATE: 61 BPM | HEIGHT: 76 IN | RESPIRATION RATE: 16 BRPM | WEIGHT: 203 LBS | DIASTOLIC BLOOD PRESSURE: 68 MMHG

## 2022-03-07 DIAGNOSIS — S83.209S TEAR OF MENISCUS OF KNEE, UNSPECIFIED LATERALITY, UNSPECIFIED MENISCUS, UNSPECIFIED TEAR TYPE, UNSPECIFIED WHETHER OLD OR CURRENT TEAR, SEQUELA: ICD-10-CM

## 2022-03-07 DIAGNOSIS — Z01.818 PREOP GENERAL PHYSICAL EXAM: Primary | ICD-10-CM

## 2022-03-07 LAB
ANION GAP SERPL CALCULATED.3IONS-SCNC: 5 MMOL/L (ref 3–14)
BUN SERPL-MCNC: 17 MG/DL (ref 7–30)
CALCIUM SERPL-MCNC: 9.3 MG/DL (ref 8.5–10.1)
CHLORIDE BLD-SCNC: 108 MMOL/L (ref 94–109)
CO2 SERPL-SCNC: 27 MMOL/L (ref 20–32)
CREAT SERPL-MCNC: 1.11 MG/DL (ref 0.66–1.25)
GFR SERPL CREATININE-BSD FRML MDRD: 77 ML/MIN/1.73M2
GLUCOSE BLD-MCNC: 100 MG/DL (ref 70–99)
HGB BLD-MCNC: 15.1 G/DL (ref 13.3–17.7)
POTASSIUM BLD-SCNC: 4.5 MMOL/L (ref 3.4–5.3)
SODIUM SERPL-SCNC: 140 MMOL/L (ref 133–144)

## 2022-03-07 PROCEDURE — 99204 OFFICE O/P NEW MOD 45 MIN: CPT | Performed by: PHYSICIAN ASSISTANT

## 2022-03-07 PROCEDURE — 80048 BASIC METABOLIC PNL TOTAL CA: CPT | Performed by: PHYSICIAN ASSISTANT

## 2022-03-07 PROCEDURE — 36415 COLL VENOUS BLD VENIPUNCTURE: CPT | Performed by: PHYSICIAN ASSISTANT

## 2022-03-07 PROCEDURE — 85018 HEMOGLOBIN: CPT | Performed by: PHYSICIAN ASSISTANT

## 2022-03-07 RX ORDER — NYSTATIN 500000 [USP'U]/1
TABLET, COATED ORAL
COMMUNITY
Start: 2022-02-11 | End: 2022-09-13

## 2022-03-07 ASSESSMENT — PAIN SCALES - GENERAL: PAINLEVEL: NO PAIN (0)

## 2022-03-07 NOTE — PATIENT INSTRUCTIONS
Preparing for Your Surgery  Getting started  A nurse will call you to review your health history and instructions. They will give you an arrival time based on your scheduled surgery time. Please be ready to share:    Your doctor's clinic name and phone number    Your medical, surgical and anesthesia history    A list of allergies and sensitivities    A list of medicines, including herbal treatments and over-the-counter drugs    Whether the patient has a legal guardian (ask how to send us the papers in advance)  Please tell us if you're pregnant--or if there's any chance you might be pregnant. Some surgeries may injure a fetus (unborn baby), so they require a pregnancy test. Surgeries that are safe for a fetus don't always need a test, and you can choose whether to have one.   If you have a child who's having surgery, please ask for a copy of Preparing for Your Child's Surgery.    Preparing for surgery    Within 30 days of surgery: Have a pre-op exam (sometimes called an H&P, or History and Physical). This can be done at a clinic or pre-operative center.  ? If you're having a , you may not need this exam. Talk to your care team.    At your pre-op exam, talk to your care team about all medicines you take. If you need to stop any medicines before surgery, ask when to start taking them again.  ? We do this for your safety. Many medicines can make you bleed too much during surgery. Some change how well surgery (anesthesia) drugs work.    Call your insurance company to let them know you're having surgery. (If you don't have insurance, call [phone].)    Call your clinic if there's any change in your health. This includes signs of a cold or flu (sore throat, runny nose, cough, rash, fever). It also includes a scrape or scratch near the surgery site.    If you have questions on the day of surgery, call your hospital or surgery center.  COVID testing  You may need to be tested for COVID-19 before having  surgery. If so, your surgical team will give you instructions for scheduling this test, separate from your preoperative history and physical.  Eating and drinking guidelines  For your safety: Unless your surgeon tells you otherwise, follow the guidelines below.    Eat and drink as usual until 8 hours before surgery. After that, no food or milk.    Drink clear liquids until 2 hours before surgery. These are liquids you can see through, like water, Gatorade and Propel Water. You may also have black coffee and tea (no cream or milk).    Nothing by mouth within 2 hours of surgery. This includes gum, candy and breath mints.    If you drink alcohol: Stop drinking it the night before surgery.    If your care team tells you to take medicine on the morning of surgery, it's okay to take it with a sip of water.  Preventing infection    Shower or bathe the night before and morning of your surgery. Follow the instructions your clinic gave you. (If no instructions, use regular soap.)    Don't shave or clip hair near your surgery site. We'll remove the hair if needed.    Don't smoke or vape the morning of surgery. You may chew nicotine gum up to 2 hours before surgery. A nicotine patch is okay.  ? Note: Some surgeries require you to completely quit smoking and nicotine. Check with your surgeon.    Your care team will make every effort to keep you safe from infection. We will:  ? Clean our hands often with soap and water (or an alcohol-based hand rub).  ? Clean the skin at your surgery site with a special soap that kills germs.  ? Give you a special gown to keep you warm. (Cold raises the risk of infection.)  ? Wear special hair covers, masks, gowns and gloves during surgery.  ? Give antibiotic medicine, if prescribed. Not all surgeries need antibiotics.  What to bring on the day of surgery    Photo ID and insurance card    Copy of your health care directive, if you have one    Glasses and hearing aides (bring cases)  ? You can't  wear contacts during surgery    Inhaler and eye drops, if you use them (tell us about these when you arrive)    CPAP machine or breathing device, if you use them    A few personal items, if spending the night    If you have . . .  ? A pacemaker, ICD (cardiac defibrillator) or other implant: Bring the ID card.  ? An implanted stimulator: Bring the remote control.  ? A legal guardian: Bring a copy of the certified (court-stamped) guardianship papers.  Please remove any jewelry, including body piercings. Leave jewelry and other valuables at home.  If you're going home the day of surgery    You must have a responsible adult drive you home. They should stay with you overnight as well.    If you don't have someone to stay with you, and you aren't safe to go home alone, we may keep you overnight. Insurance often won't pay for this.  After surgery  If it's hard to control your pain or you need more pain medicine, please call your surgeon's office.  Questions?   If you have any questions for your care team, list them here: _________________________________________________________________________________________________________________________________________________________________________ ____________________________________ ____________________________________ ____________________________________  For informational purposes only. Not to replace the advice of your health care provider. Copyright   2003, 2019 [facility]. All rights reserved. Clinically reviewed by Aylin Severino MD. timeplazza [id number] - REV 07/21.

## 2022-03-07 NOTE — PROGRESS NOTES
[address] [address]  Phone: [phone]  Primary Provider: Jagdeep Jennings  Pre-op Performing Provider: CARMEN WARREN    PREOPERATIVE EVALUATION:  Today's date: 3/7/2022    Colby Marx is a 56 year old male who presents for a preoperative evaluation.    Surgical Information:  Surgery/Procedure: Knee  Surgery Location: [facility]  Surgeon: Bakari  Surgery Date: 03/11/22  Time of Surgery: TBD  Where patient plans to recover: At home with family  Fax number for surgical facility: [phone]    Type of Anesthesia Anticipated: to be determined    Assessment & Plan     The proposed surgical procedure is considered INTERMEDIATE risk.    Preop general physical exam  - Asymptomatic COVID-19 Virus (Coronavirus) by PCR Nose; Future  - Hemoglobin; Future  - Basic metabolic panel  (Ca, Cl, CO2, Creat, Gluc, K, Na, BUN); Future  - Hemoglobin  - Basic metabolic panel  (Ca, Cl, CO2, Creat, Gluc, K, Na, BUN)    Tear of meniscus of knee, unspecified laterality, unspecified meniscus, unspecified tear type, unspecified whether old or current tear, sequela           Risks and Recommendations:  The patient has the following additional risks and recommendations for perioperative complications:      Medication Instructions:   - apixaban (Eliquis), edoxaban (Savaysa), rivaroxaban (Xarelto): Bleeding risk is low for this procedure AND CrCl (>=) 50 mL/min. HOLD 1 day before surgery.      RECOMMENDATION:  APPROVAL GIVEN to proceed with proposed procedure, without further diagnostic evaluation.  56}    Subjective     HPI related to upcoming procedure: Colby presents to the clinic for preoperative examination prior to knee arthroscopy.  He is feeling well today, no concerns    Preop Questions 3/7/2022   1. Have you ever had a heart attack or stroke? YES - 2015   2. Have you ever had surgery on your heart or blood vessels, such as a stent placement, a coronary artery  bypass, or surgery on an artery in your head, neck, heart, or legs? No   3. Do you have chest pain with activity? No   4. Do you have a history of  heart failure? No   5. Do you currently have a cold, bronchitis or symptoms of other infection? No   6. Do you have a cough, shortness of breath, or wheezing? No   7. Do you or anyone in your family have previous history of blood clots? No   8. Do you or does anyone in your family have a serious bleeding problem such as prolonged bleeding following surgeries or cuts? No   9. Have you ever had problems with anemia or been told to take iron pills? No   10. Have you had any abnormal blood loss such as black, tarry or bloody stools? No   11. Have you ever had a blood transfusion? No   12. Are you willing to have a blood transfusion if it is medically needed before, during, or after your surgery? Yes   13. Have you or any of your relatives ever had problems with anesthesia? No   14. Do you have sleep apnea, excessive snoring or daytime drowsiness? No   15. Do you have any artifical heart valves or other implanted medical devices like a pacemaker, defibrillator, or continuous glucose monitor? No   16. Do you have artificial joints? No   17. Are you allergic to latex? No       Health Care Directive:  Patient does not have a Health Care Directive or Living Will:     Preoperative Review of :   reviewed - no record of controlled substances prescribed.      Status of Chronic Conditions:  See problem list for active medical problems.  Problems all longstanding and stable, except as noted/documented.  See ROS for pertinent symptoms related to these conditions.      Review of Systems  CONSTITUTIONAL: NEGATIVE for fever, chills, change in weight  INTEGUMENTARY/SKIN: NEGATIVE for worrisome rashes, moles or lesions  EYES: NEGATIVE for vision changes or irritation  ENT/MOUTH: NEGATIVE for ear, mouth and throat problems  RESP: NEGATIVE for significant cough or SOB  CV: NEGATIVE for  chest pain, palpitations or peripheral edema  GI: NEGATIVE for nausea, abdominal pain, heartburn, or change in bowel habits  : NEGATIVE for frequency, dysuria, or hematuria  MUSCULOSKELETAL: NEGATIVE for significant arthralgias or myalgia  NEURO: NEGATIVE for weakness, dizziness or paresthesias  ENDOCRINE: NEGATIVE for temperature intolerance, skin/hair changes  HEME: NEGATIVE for bleeding problems  PSYCHIATRIC: NEGATIVE for changes in mood or affect    Patient Active Problem List    Diagnosis Date Noted     Paroxysmal atrial fibrillation (H) 05/30/2019     Priority: Medium     Sleep apnea      Priority: Medium     NSTEMI (non-ST elevated myocardial infarction) (H)      Priority: Medium     Aug 2015, cardiac cath: normal coronaries       Family history of coronary artery disease      Priority: Medium     sister had SCAD episode       Gastroesophageal reflux disease      Priority: Medium     Diagnosis updated by automated process. Provider to review and confirm.        Past Medical History:   Diagnosis Date     Family history of abdominal aortic aneurysm      Family history of coronary artery disease     sister had SCAD episode     Fatigue      Gastro-oesophageal reflux disease      NSTEMI (non-ST elevated myocardial infarction) (H)     Aug 2015, cardiac cath: normal coronaries     Paroxysmal atrial fibrillation (H)      Sleep apnea      Past Surgical History:   Procedure Laterality Date     ELBOW SURGERY       Current Outpatient Medications   Medication Sig Dispense Refill     apixaban ANTICOAGULANT (ELIQUIS) 5 MG tablet Take 1 tablet (5 mg) by mouth 2 times daily 180 tablet 0     calcium carbonate (TUMS) 500 MG chewable tablet Take 1 chew tab by mouth as needed for heartburn       Ginkgo Biloba (GINKOBA PO) Take 120 mg by mouth daily       NONFORMULARY Nioxin Hair Replacement Drops  Rub into bald spot on head twice daily.       Probiotic Product (PROBIOTIC DAILY PO) Take 2 tablets by mouth daily        "ValACYclovir HCl (VALTREX PO) Take 500 mg by mouth daily        Multiple Vitamins-Minerals (VISION VITAMINS PO) Take by mouth 2 times daily (Patient not taking: Reported on 8/10/2021)       multivitamin, therapeutic with minerals (THERA-VIT-M) TABS Take 1 tablet by mouth daily Men's Multi Vitamin (Patient not taking: Reported on 8/10/2021)       Nutritional Supplements (ANTI-INFLAMMATORY ENZYME PO) ZYFLAMEND capsule daily (Patient not taking: Reported on 8/10/2021)       nystatin (MYCOSTATIN) 271995 units TABS tablet TAKE 1 TABLET BY MOUTH FOUR TIMES DAILY       STATIN NOT PRESCRIBED, INTENTIONAL, Statin not prescribed intentionally due to normal coronary arteries and does not have significant elevated LDL. (Patient not taking: Reported on 8/10/2021)         No Known Allergies     Social History     Tobacco Use     Smoking status: Never Smoker     Smokeless tobacco: Never Used   Substance Use Topics     Alcohol use: Yes     Comment: rare     Family History   Problem Relation Age of Onset     Coronary Artery Disease Mother 80        CABG     Cerebrovascular Disease Mother         TIA     Coronary Artery Disease Father 56        MI     Coronary Artery Disease Sister 59        MI     Arrhythmia Brother      Coronary Artery Disease Sister      No Known Problems Sister      History   Drug Use No         Objective     /68   Pulse 61   Temp 97.9  F (36.6  C) (Tympanic)   Resp 16   Ht 1.93 m (6' 4\")   Wt 92.1 kg (203 lb)   SpO2 98%   BMI 24.71 kg/m      Physical Exam    GENERAL APPEARANCE: healthy, alert and no distress     EYES: EOMI,  PERRL     HENT: ear canals and TM's normal and nose and mouth without ulcers or lesions     NECK: no adenopathy, no asymmetry, masses, or scars and thyroid normal to palpation     RESP: lungs clear to auscultation - no rales, rhonchi or wheezes     CV: regular rates and rhythm, normal S1 S2, no S3 or S4 and no murmur, click or rub     ABDOMEN:  soft, nontender, no HSM or masses " and bowel sounds normal     MS: extremities normal- no gross deformities noted, no evidence of inflammation in joints, FROM in all extremities.     SKIN: no suspicious lesions or rashes     NEURO: Normal strength and tone, sensory exam grossly normal, mentation intact and speech normal     PSYCH: mentation appears normal. and affect normal/bright    Recent Labs   Lab Test 21  1413   HGB 14.6         POTASSIUM 3.9   CR 1.26*        Diagnostics:  Recent Results (from the past 24 hour(s))   Hemoglobin    Collection Time: 22  9:26 AM   Result Value Ref Range    Hemoglobin 15.1 13.3 - 17.7 g/dL      [id number]  Cardiology visit: 2021    Revised Cardiac Risk Index (RCRI):  The patient has the following serious cardiovascular risks for perioperative complications:   - No serious cardiac risks = 0 points     RCRI Interpretation: 1 point: Class II (low risk - 0.9% complication rate)           Signed Electronically by: Dayday Maloney PA-C  Copy of this evaluation report is provided to requesting physician.

## 2022-03-10 ENCOUNTER — TELEPHONE (OUTPATIENT)
Dept: CARDIOLOGY | Facility: CLINIC | Age: 57
End: 2022-03-10
Payer: COMMERCIAL

## 2022-03-10 NOTE — TELEPHONE ENCOUNTER
I spoke to Maritza from the [facility] clinic and she is asking if patient ever had his stress echo that was ordered by hospital in July 2021. In reviewing the chart it appears he never scheduled this test and it was never mentioned by Dr. Perea in his August 2021 clinic.    His last echo was in 2018 and he had a Holter in July 2021. Maritza is requesting these reports and I will fax them to her at [phone].

## 2022-03-10 NOTE — TELEPHONE ENCOUNTER
[facility] Call Center    Phone Message    May a detailed message be left on voicemail: yes     Reason for Call: Other: Maritza called in wanting to know if Pt ever discussed not having the post hospital echo with Dr. Perea. There is no mention of it in the LOV notes and it is still listed in active orders. Please c/b to discuss, pt is supposed to have surgery tomorrow     Action Taken: Message routed to:  Other: [facility]    Travel Screening: Not Applicable

## 2022-03-11 NOTE — RESULT ENCOUNTER NOTE
Colby-  Here are your recent results.    Your labs look great! Let me know if you have questions    Dayday Maloney PA-C

## 2022-09-04 ENCOUNTER — HEALTH MAINTENANCE LETTER (OUTPATIENT)
Age: 57
End: 2022-09-04

## 2022-09-13 ENCOUNTER — VIRTUAL VISIT (OUTPATIENT)
Dept: FAMILY MEDICINE | Facility: CLINIC | Age: 57
End: 2022-09-13
Payer: COMMERCIAL

## 2022-09-13 DIAGNOSIS — B00.9 HERPES SIMPLEX VIRUS INFECTION: Primary | ICD-10-CM

## 2022-09-13 DIAGNOSIS — I48.0 PAROXYSMAL ATRIAL FIBRILLATION (H): ICD-10-CM

## 2022-09-13 PROBLEM — Z13.220 SCREENING FOR HYPERLIPIDEMIA: Status: ACTIVE | Noted: 2022-09-13

## 2022-09-13 PROCEDURE — 99213 OFFICE O/P EST LOW 20 MIN: CPT | Mod: 95 | Performed by: PHYSICIAN ASSISTANT

## 2022-09-13 RX ORDER — VALACYCLOVIR HYDROCHLORIDE 500 MG/1
500 TABLET, FILM COATED ORAL DAILY
Qty: 90 TABLET | Refills: 3 | Status: SHIPPED | OUTPATIENT
Start: 2022-09-13 | End: 2023-08-28

## 2022-09-13 NOTE — PROGRESS NOTES
Colby is a 57 year old who is being evaluated via a billable video visit.      How would you like to obtain your AVS? MyChart  If the video visit is dropped, the invitation should be resent by: Text to cell phone: [phone]  Will anyone else be joining your video visit? No        Assessment & Plan     Herpes simplex virus infection  Controlled, recent labs normal.  No acute concerns. Refilled x 1 year  - valACYclovir (VALTREX) 500 MG tablet; Take 1 tablet (500 mg) by mouth daily    Paroxysmal atrial fibrillation (H)  Stable, following with cardiology at [facility]           SUBHASH Serrano [facility] MARCIAL Bowman is a 57 year old, presenting for the following health issues:  No chief complaint on file.      HPI     Medication Followup of :Valacyclovir        Taking Medication as prescribed: yes    Side Effects:  None    Medication Helping Symptoms:  yes    Colby recently changed insurance from [facility] to .  He has been taking Valtrex 500 mg daily for chronic suppression of genital herpes x 25 years.  He has not had an outbreak in that time period.  He has not experienced side effects. Needs refill today    Review of Systems         Objective           Vitals:  No vitals were obtained today due to virtual visit.    Physical Exam   GENERAL: Healthy, alert and no distress  EYES: Eyes grossly normal to inspection.  No discharge or erythema, or obvious scleral/conjunctival abnormalities.  RESP: No audible wheeze, cough, or visible cyanosis.  No visible retractions or increased work of breathing.    SKIN: Visible skin clear. No significant rash, abnormal pigmentation or lesions.  NEURO: Cranial nerves grossly intact.  Mentation and speech appropriate for age.  PSYCH: Mentation appears normal, affect normal/bright, judgement and insight intact, normal speech and appearance well-groomed.            Video-Visit Details    Video Start Time: 203 pm  Type  of service:  Video Visit    Video End Time 214 pm    Originating Location (pt. Location): Home    Distant Location (provider location):  [facility]     Platform used for Video Visit: [employer]

## 2023-01-21 ENCOUNTER — HEALTH MAINTENANCE LETTER (OUTPATIENT)
Age: 58
End: 2023-01-21

## 2023-01-23 ENCOUNTER — OFFICE VISIT (OUTPATIENT)
Dept: CARDIOLOGY | Facility: CLINIC | Age: 58
End: 2023-01-23
Payer: COMMERCIAL

## 2023-01-23 VITALS
OXYGEN SATURATION: 98 % | HEIGHT: 76 IN | WEIGHT: 201 LBS | BODY MASS INDEX: 24.48 KG/M2 | DIASTOLIC BLOOD PRESSURE: 83 MMHG | SYSTOLIC BLOOD PRESSURE: 134 MMHG | HEART RATE: 66 BPM

## 2023-01-23 DIAGNOSIS — I25.2 OLD MYOCARDIAL INFARCTION: Primary | ICD-10-CM

## 2023-01-23 DIAGNOSIS — I48.0 PAROXYSMAL ATRIAL FIBRILLATION (H): ICD-10-CM

## 2023-01-23 PROCEDURE — 99214 OFFICE O/P EST MOD 30 MIN: CPT | Performed by: INTERNAL MEDICINE

## 2023-01-23 RX ORDER — AMOXICILLIN 875 MG
1 TABLET ORAL
COMMUNITY
Start: 2023-01-15 | End: 2024-03-06

## 2023-01-23 NOTE — PROGRESS NOTES
HPI and Plan:   It is my pleasure to see this very pleasant 57-year-old gentleman for follow-up of non-ST elevation MI and paroxysmal atrial fibrillation.     The MI occurred in 2015.  He was under a lot of stress at that time.  Coronary arteries were normal as was his LVEF.  However MRI showed evidence of a prior inferior infarction.  He was later diagnosed with paroxysmal atrial fibrillation and it is presumed that he had thromboembolic episode from PAF leading to the infarction.  He has since been on Eliquis which he tolerates well.     Overall he has been well since I last saw him a year and a half ago.    However he tells me that he had some fluid retention for part of last year.  He switched to a soy-based yogurt.  He was doing a lot of driving.  He drove to [place] in [place] and multiple times to [place] for his work.  Since stopping soy yogurt the fluid retention has resolved.  Indeed his cardiac examination is completely normal today.    I reassured him that his fluid retention is highly unlikely to be from compromise cardiac function.  I think it is more likely to be from lack of exercise when he is on the road with subsequent weight gain.  He also had some ankle swelling and this would not be surprising given the amount of time he has been driving.  Any case I am happy to hear that everything has resolved.    Blood pressure is under adequate control.  He reports no bleeding.    He did have an echocardiogram at the [facility] which I personally reviewed.  I am happy to see that essentially normal and no regional wall motion abnormalities were seen.    I also looked at some of the CT scans that he has had as he is concerned about atherosclerotic buildup in his heart arteries.  Once again report commented on it unremarkable mediastinum.  I explained to him that at his age there is a good probability that he will have minor calcification.  He is scheduled to have another CT scan  this year as he has a pulmonary nodule.  I asked him to have the radiologist comment on the presence of coronary calcification.  If none that would be great.  If coronary calcification is detected we can perform a formal low-dose CT scan of his chest to quantify his calcium.    It is always enjoyable talking that this gentleman who is acute and severe.  I look forward to seeing him again in about a years time for continued follow-up    Orders Placed This Encounter   Procedures     Follow-Up with Cardiology       Orders Placed This Encounter   Medications     amoxicillin (AMOXIL) 875 MG tablet     Sig: Take 1 tablet by mouth 2 times daily     apixaban ANTICOAGULANT (ELIQUIS) 5 MG tablet     Sig: Take 1 tablet (5 mg) by mouth 2 times daily     Dispense:  180 tablet     Refill:  3       Encounter Diagnoses   Name Primary?     Paroxysmal atrial fibrillation (H)      Old myocardial infarction Yes       CURRENT MEDICATIONS:  Current Outpatient Medications   Medication Sig Dispense Refill     amoxicillin (AMOXIL) 875 MG tablet Take 1 tablet by mouth 2 times daily       apixaban ANTICOAGULANT (ELIQUIS) 5 MG tablet Take 1 tablet (5 mg) by mouth 2 times daily 180 tablet 3     Ginkgo Biloba (GINKOBA PO) Take 120 mg by mouth daily       Probiotic Product (PROBIOTIC DAILY PO) Take 2 tablets by mouth daily       STATIN NOT PRESCRIBED, INTENTIONAL, Statin not prescribed intentionally due to normal coronary arteries and does not have significant elevated LDL.       valACYclovir (VALTREX) 500 MG tablet Take 1 tablet (500 mg) by mouth daily 90 tablet 3     calcium carbonate (TUMS) 500 MG chewable tablet Take 1 chew tab by mouth as needed for heartburn (Patient not taking: Reported on 1/23/2023)         ALLERGIES   No Known Allergies    PAST MEDICAL HISTORY:  Past Medical History:   Diagnosis Date     Family history of abdominal aortic aneurysm      Family history of coronary artery disease     sister had SCAD episode     Fatigue       "Gastro-oesophageal reflux disease      NSTEMI (non-ST elevated myocardial infarction) (H)     Aug 2015, cardiac cath: normal coronaries     Paroxysmal atrial fibrillation (H)      Sleep apnea        PAST SURGICAL HISTORY:  Past Surgical History:   Procedure Laterality Date     ELBOW SURGERY         FAMILY HISTORY:  Family History   Problem Relation Age of Onset     Coronary Artery Disease Mother 80        CABG     Cerebrovascular Disease Mother         TIA     Coronary Artery Disease Father 56        MI     Coronary Artery Disease Sister 59        MI     Arrhythmia Brother      Coronary Artery Disease Sister      No Known Problems Sister        SOCIAL HISTORY:  Social History     Socioeconomic History     Marital status: Single     Spouse name: None     Number of children: None     Years of education: None     Highest education level: None   Tobacco Use     Smoking status: Never     Smokeless tobacco: Never   Substance and Sexual Activity     Alcohol use: Yes     Comment: rare     Drug use: No   Other Topics Concern     Parent/sibling w/ CABG, MI or angioplasty before 65F 55M? Yes     Caffeine Concern No     Special Diet Yes     Comment: Gluten and dairy free     Exercise Yes     Comment: 2-3 times week, pilates, ellyptical and weights, yoga     Seat Belt Yes       Review of Systems:  Skin:  Negative bruising   Eyes:  not assessed glasses  ENT:  Positive for sinus trouble  Respiratory:  Positive for sleep apnea  Cardiovascular:  Negative;palpitations;chest pain;fatigue;dizziness;lightheadedness;edema    Gastroenterology: not assessed heartburn  Genitourinary:  Negative    Musculoskeletal:  Positive for neck pain;back pain  Neurologic:  Negative numbness or tingling of hands  Psychiatric:  Negative excessive stress  Heme/Lymph/Imm:  Negative    Endocrine:  Negative thyroid disorder;diabetes    Physical Exam:  Vitals: /83   Pulse 66   Ht 1.93 m (6' 4\")   Wt 91.2 kg (201 lb)   SpO2 98%   BMI 24.47 kg/m  "     Constitutional:  cooperative, alert and oriented, well developed, well nourished, in no acute distress        Skin:  warm and dry to the touch, no apparent skin lesions or masses noted          Head:  normocephalic, no masses or lesions        Eyes:           Lymph:No Cervical lymphadenopathy present     ENT:  no pallor or cyanosis, dentition good        Neck:  carotid pulses are full and equal bilaterally, JVP normal, no carotid bruit        Respiratory:  normal breath sounds, clear to auscultation, normal A-P diameter, normal symmetry, normal respiratory excursion, no use of accessory muscles         Cardiac: regular rhythm, normal S1/S2, no S3 or S4, apical impulse not displaced, no murmurs, gallops or rubs                pulses full and equal, no bruits auscultated                                        GI:  abdomen soft, non-tender, BS normoactive, no mass, no HSM, no bruits        Extremities and Muscular Skeletal:  no edema;no deformities, clubbing, cyanosis, erythema observed              Neurological:  no gross motor deficits;affect appropriate        Psych:  Alert and Oriented x 3        Recent Lab Results:  LIPID RESULTS:  Lab Results   Component Value Date    CHOL 124 07/31/2017    HDL 33 07/31/2017    LDL 79 07/31/2017    TRIG 59 07/31/2017       LIVER ENZYME RESULTS:  Lab Results   Component Value Date    AST 23 01/08/2016    ALT 40 01/08/2016       CBC RESULTS:  Lab Results   Component Value Date    WBC 5.4 07/23/2021    WBC 9.0 05/08/2018    RBC 4.57 07/23/2021    RBC 4.58 05/08/2018    HGB 15.1 03/07/2022    HGB 15.2 05/08/2018    HCT 43.7 07/23/2021    HCT 43.9 05/08/2018    MCV 96 07/23/2021    MCV 96 05/08/2018    MCH 31.9 07/23/2021    MCH 33.2 (H) 05/08/2018    MCHC 33.4 07/23/2021    MCHC 34.6 05/08/2018    RDW 12.2 07/23/2021    RDW 12.5 05/08/2018     07/23/2021     05/08/2018       BMP RESULTS:  Lab Results   Component Value Date     03/07/2022     05/08/2018     POTASSIUM 4.5 03/07/2022    POTASSIUM 3.6 05/08/2018    CHLORIDE 108 03/07/2022    CHLORIDE 108 05/08/2018    CO2 27 03/07/2022    CO2 27 05/08/2018    ANIONGAP 5 03/07/2022    ANIONGAP 8 05/08/2018     (H) 03/07/2022     (H) 05/08/2018    BUN 17 03/07/2022    BUN 17 05/08/2018    CR 1.11 03/07/2022    CR 1.13 05/08/2018    GFRESTIMATED 77 03/07/2022    GFRESTIMATED 67 05/08/2018    GFRESTBLACK 82 05/08/2018    NICOLETTE 9.3 03/07/2022    NICOLETTE 9.1 05/08/2018        A1C RESULTS:  No results found for: A1C    INR RESULTS:  No results found for: INR        CC  No referring provider defined for this encounter.

## 2023-01-23 NOTE — LETTER
1/23/2023    DO Radha [facility] [place] [address] [place] [address]    RE: Colby VAUGHAN        Dear Colleague,     I had the pleasure of seeing Colby Marx in the [facility].  HPI and Plan:   It is my pleasure to see this very pleasant 57-year-old gentleman for follow-up of non-ST elevation MI and paroxysmal atrial fibrillation.     The MI occurred in 2015.  He was under a lot of stress at that time.  Coronary arteries were normal as was his LVEF.  However MRI showed evidence of a prior inferior infarction.  He was later diagnosed with paroxysmal atrial fibrillation and it is presumed that he had thromboembolic episode from PAF leading to the infarction.  He has since been on Eliquis which he tolerates well.     Overall he has been well since I last saw him a year and a half ago.    However he tells me that he had some fluid retention for part of last year.  He switched to a soy-based yogurt.  He was doing a lot of driving.  He drove to [place] in [place] and multiple times to [place] for his work.  Since stopping soy yogurt the fluid retention has resolved.  Indeed his cardiac examination is completely normal today.    I reassured him that his fluid retention is highly unlikely to be from compromise cardiac function.  I think it is more likely to be from lack of exercise when he is on the road with subsequent weight gain.  He also had some ankle swelling and this would not be surprising given the amount of time he has been driving.  Any case I am happy to hear that everything has resolved.    Blood pressure is under adequate control.  He reports no bleeding.    He did have an echocardiogram at the [facility] which I personally reviewed.  I am happy to see that essentially normal and no regional wall motion abnormalities were seen.    I also looked at some of the CT scans that he has had as he is concerned about atherosclerotic buildup  in his heart arteries.  Once again report commented on it unremarkable mediastinum.  I explained to him that at his age there is a good probability that he will have minor calcification.  He is scheduled to have another CT scan this year as he has a pulmonary nodule.  I asked him to have the radiologist comment on the presence of coronary calcification.  If none that would be great.  If coronary calcification is detected we can perform a formal low-dose CT scan of his chest to quantify his calcium.    It is always enjoyable talking that this gentleman who is acute and severe.  I look forward to seeing him again in about a years time for continued follow-up    Orders Placed This Encounter   Procedures     Follow-Up with Cardiology       Orders Placed This Encounter   Medications     amoxicillin (AMOXIL) 875 MG tablet     Sig: Take 1 tablet by mouth 2 times daily     apixaban ANTICOAGULANT (ELIQUIS) 5 MG tablet     Sig: Take 1 tablet (5 mg) by mouth 2 times daily     Dispense:  180 tablet     Refill:  3       Encounter Diagnoses   Name Primary?     Paroxysmal atrial fibrillation (H)      Old myocardial infarction Yes       CURRENT MEDICATIONS:  Current Outpatient Medications   Medication Sig Dispense Refill     amoxicillin (AMOXIL) 875 MG tablet Take 1 tablet by mouth 2 times daily       apixaban ANTICOAGULANT (ELIQUIS) 5 MG tablet Take 1 tablet (5 mg) by mouth 2 times daily 180 tablet 3     Ginkgo Biloba (GINKOBA PO) Take 120 mg by mouth daily       Probiotic Product (PROBIOTIC DAILY PO) Take 2 tablets by mouth daily       STATIN NOT PRESCRIBED, INTENTIONAL, Statin not prescribed intentionally due to normal coronary arteries and does not have significant elevated LDL.       valACYclovir (VALTREX) 500 MG tablet Take 1 tablet (500 mg) by mouth daily 90 tablet 3     calcium carbonate (TUMS) 500 MG chewable tablet Take 1 chew tab by mouth as needed for heartburn (Patient not taking: Reported on 1/23/2023)          ALLERGIES   No Known Allergies    PAST MEDICAL HISTORY:  Past Medical History:   Diagnosis Date     Family history of abdominal aortic aneurysm      Family history of coronary artery disease     sister had SCAD episode     Fatigue      Gastro-oesophageal reflux disease      NSTEMI (non-ST elevated myocardial infarction) (H)     Aug 2015, cardiac cath: normal coronaries     Paroxysmal atrial fibrillation (H)      Sleep apnea        PAST SURGICAL HISTORY:  Past Surgical History:   Procedure Laterality Date     ELBOW SURGERY         FAMILY HISTORY:  Family History   Problem Relation Age of Onset     Coronary Artery Disease Mother 80        CABG     Cerebrovascular Disease Mother         TIA     Coronary Artery Disease Father 56        MI     Coronary Artery Disease Sister 59        MI     Arrhythmia Brother      Coronary Artery Disease Sister      No Known Problems Sister        SOCIAL HISTORY:  Social History     Socioeconomic History     Marital status: Single     Spouse name: None     Number of children: None     Years of education: None     Highest education level: None   Tobacco Use     Smoking status: Never     Smokeless tobacco: Never   Substance and Sexual Activity     Alcohol use: Yes     Comment: rare     Drug use: No   Other Topics Concern     Parent/sibling w/ CABG, MI or angioplasty before 65F 55M? Yes     Caffeine Concern No     Special Diet Yes     Comment: Gluten and dairy free     Exercise Yes     Comment: 2-3 times week, pilates, ellyptical and weights, yoga     Seat Belt Yes       Review of Systems:  Skin:  Negative bruising   Eyes:  not assessed glasses  ENT:  Positive for sinus trouble  Respiratory:  Positive for sleep apnea  Cardiovascular:  Negative;palpitations;chest pain;fatigue;dizziness;lightheadedness;edema    Gastroenterology: not assessed heartburn  Genitourinary:  Negative    Musculoskeletal:  Positive for neck pain;back pain  Neurologic:  Negative numbness or tingling of  "hands  Psychiatric:  Negative excessive stress  Heme/Lymph/Imm:  Negative    Endocrine:  Negative thyroid disorder;diabetes    Physical Exam:  Vitals: /83   Pulse 66   Ht 1.93 m (6' 4\")   Wt 91.2 kg (201 lb)   SpO2 98%   BMI 24.47 kg/m      Constitutional:  cooperative, alert and oriented, well developed, well nourished, in no acute distress        Skin:  warm and dry to the touch, no apparent skin lesions or masses noted          Head:  normocephalic, no masses or lesions        Eyes:           Lymph:No Cervical lymphadenopathy present     ENT:  no pallor or cyanosis, dentition good        Neck:  carotid pulses are full and equal bilaterally, JVP normal, no carotid bruit        Respiratory:  normal breath sounds, clear to auscultation, normal A-P diameter, normal symmetry, normal respiratory excursion, no use of accessory muscles         Cardiac: regular rhythm, normal S1/S2, no S3 or S4, apical impulse not displaced, no murmurs, gallops or rubs                pulses full and equal, no bruits auscultated                                        GI:  abdomen soft, non-tender, BS normoactive, no mass, no HSM, no bruits        Extremities and Muscular Skeletal:  no edema;no deformities, clubbing, cyanosis, erythema observed              Neurological:  no gross motor deficits;affect appropriate        Psych:  Alert and Oriented x 3        Recent Lab Results:  LIPID RESULTS:  Lab Results   Component Value Date    CHOL 124 07/31/2017    HDL 33 07/31/2017    LDL 79 07/31/2017    TRIG 59 07/31/2017       LIVER ENZYME RESULTS:  Lab Results   Component Value Date    AST 23 01/08/2016    ALT 40 01/08/2016       CBC RESULTS:  Lab Results   Component Value Date    WBC 5.4 07/23/2021    WBC 9.0 05/08/2018    RBC 4.57 07/23/2021    RBC 4.58 05/08/2018    HGB 15.1 03/07/2022    HGB 15.2 05/08/2018    HCT 43.7 07/23/2021    HCT 43.9 05/08/2018    MCV 96 07/23/2021    MCV 96 05/08/2018    MCH 31.9 07/23/2021    MCH 33.2 (H) " 05/08/2018    MCHC 33.4 07/23/2021    MCHC 34.6 05/08/2018    RDW 12.2 07/23/2021    RDW 12.5 05/08/2018     07/23/2021     05/08/2018       BMP RESULTS:  Lab Results   Component Value Date     03/07/2022     05/08/2018    POTASSIUM 4.5 03/07/2022    POTASSIUM 3.6 05/08/2018    CHLORIDE 108 03/07/2022    CHLORIDE 108 05/08/2018    CO2 27 03/07/2022    CO2 27 05/08/2018    ANIONGAP 5 03/07/2022    ANIONGAP 8 05/08/2018     (H) 03/07/2022     (H) 05/08/2018    BUN 17 03/07/2022    BUN 17 05/08/2018    CR 1.11 03/07/2022    CR 1.13 05/08/2018    GFRESTIMATED 77 03/07/2022    GFRESTIMATED 67 05/08/2018    GFRESTBLACK 82 05/08/2018    NICOLETTE 9.3 03/07/2022    NICOLETTE 9.1 05/08/2018        A1C RESULTS:  No results found for: A1C    INR RESULTS:  No results found for: INR        CC  No referring provider defined for this encounter.    Thank you for allowing me to participate in the care of your patient.      Sincerely,     DR HILARIO HAYWOOD MD     [facility]

## 2023-02-01 ENCOUNTER — OFFICE VISIT (OUTPATIENT)
Dept: FAMILY MEDICINE | Facility: CLINIC | Age: 58
End: 2023-02-01
Payer: COMMERCIAL

## 2023-02-01 DIAGNOSIS — Z85.828 HISTORY OF BASAL CELL CARCINOMA: ICD-10-CM

## 2023-02-01 DIAGNOSIS — D22.9 MULTIPLE BENIGN NEVI: Primary | ICD-10-CM

## 2023-02-01 DIAGNOSIS — L82.1 SEBORRHEIC KERATOSES: ICD-10-CM

## 2023-02-01 DIAGNOSIS — D18.01 CHERRY ANGIOMA: ICD-10-CM

## 2023-02-01 DIAGNOSIS — Z12.83 ENCOUNTER FOR SCREENING FOR MALIGNANT NEOPLASM OF SKIN: ICD-10-CM

## 2023-02-01 DIAGNOSIS — L81.4 LENTIGINES: ICD-10-CM

## 2023-02-01 PROCEDURE — 99203 OFFICE O/P NEW LOW 30 MIN: CPT | Performed by: NURSE PRACTITIONER

## 2023-02-01 ASSESSMENT — PAIN SCALES - GENERAL: PAINLEVEL: NO PAIN (0)

## 2023-02-01 NOTE — LETTER
2/1/2023         RE: Colby Marx  [address]  [place] [address]        Dear Colleague,    [facility] Marx[facility] Please see a copy of my visit note below.    [facility] Dermatology Note  Encounter Date: Feb 1, 2023  Office Visit     Reviewed patients past medical history and pertinent chart review prior to patients visit today.     Dermatology Problem List:  History of BCC, right upper chest, s/p ED&C 8/2011    ____________________________________________    Assessment & Plan:     # History of BCC on the right upper chest, well-healed circular scar with no signs of recurrent malignancy    # Benign skin findings including: seborrheic keratoses, cherry angioma, lentigines and benign nevi.   - No further intervention required. Patient to report changes.   - Patient reassured of the benign nature of these lesions.    #Signs and Symptoms of non-melanoma skin cancer and ABCDEs of melanoma reviewed with patient. Patient encouraged to perform monthly self skin exams and educated on how to perform them. UV precautions reviewed with patient. Patient was asked about new or changing moles/lesions on body.     #Reviewed Sunscreen: Apply 20 minutes prior to going outdoors and reapply every two hours, when wet or sweating. We recommend using an SPF 30 or higher, and to use one that is water resistant.       Follow-up:  1 years for follow up full body skin exam, prn for new or changing lesions or new concerns    Inessa Woo, JUAN ANTONIO CNP on 2/1/2023 at 2:14 PM    ____________________________________________    CC: Skin Check ([facility]. Hx. Of BCC)    HPI:  Mr. Colby Marx is a(n) 57 year old male who presents today as a new patient for a full body skin cancer screening. Patient has concerns today about some brown rough spots on his back, chest, arms and legs. They are asymptomatic     Patient is otherwise feeling  well, without additional skin concerns.     Physical Exam:  Vitals: There were no vitals taken for this visit.  SKIN: Total skin excluding the genitalia areas was performed. The exam included the head/face, neck, both arms, chest, back, abdomen, both legs, digits, mons pubis, buttock and nails.   -Right upper chest, well-healed circular scar without signs of erythema or recurrence of malignancy  -several 1-2mm red dome shaped symmetric papules scattered on the trunk  -multiple tan/brown flat round macules and raised papules scattered throughout trunk, extremities and head. No worrisome features for malignancy noted on examination.  -scattered tan, homogenous macules scattered on sun exposed areas of trunk, extremities and face.   -scattered waxy, stuck on tan/brown papules and patches on the trunk, face, scalp, and extremities    - No other lesions of concern on areas examined.     Medications:  Current Outpatient Medications   Medication     amoxicillin (AMOXIL) 875 MG tablet     apixaban ANTICOAGULANT (ELIQUIS) 5 MG tablet     calcium carbonate (TUMS) 500 MG chewable tablet     Ginkgo Biloba (GINKOBA PO)     Probiotic Product (PROBIOTIC DAILY PO)     STATIN NOT PRESCRIBED, INTENTIONAL,     valACYclovir (VALTREX) 500 MG tablet     No current facility-administered medications for this visit.      Past Medical History:   Patient Active Problem List   Diagnosis     Gastroesophageal reflux disease     NSTEMI (non-ST elevated myocardial infarction) (H)     Family history of coronary artery disease     Sleep apnea     Paroxysmal atrial fibrillation (H)     Screening for hyperlipidemia     Past Medical History:   Diagnosis Date     Family history of abdominal aortic aneurysm      Family history of coronary artery disease     sister had SCAD episode     Fatigue      Gastro-oesophageal reflux disease      NSTEMI (non-ST elevated myocardial infarction) (H)     Aug 2015, cardiac cath: normal coronaries     Paroxysmal atrial  fibrillation (H)      Sleep apnea        CC Referred Self, MD  No address on file on close of this encounter.      Again, thank you for allowing me to participate in the care of your patient.        Sincerely,        JUAN ANTONIO Alejandro CNP

## 2023-02-01 NOTE — PROGRESS NOTES
[facility] Dermatology Note  Encounter Date: Feb 1, 2023  Office Visit     Reviewed patients past medical history and pertinent chart review prior to patients visit today.     Dermatology Problem List:  History of BCC, right upper chest, s/p ED&C 8/2011    ____________________________________________    Assessment & Plan:     # History of BCC on the right upper chest, well-healed circular scar with no signs of recurrent malignancy    # Benign skin findings including: seborrheic keratoses, cherry angioma, lentigines and benign nevi.   - No further intervention required. Patient to report changes.   - Patient reassured of the benign nature of these lesions.    #Signs and Symptoms of non-melanoma skin cancer and ABCDEs of melanoma reviewed with patient. Patient encouraged to perform monthly self skin exams and educated on how to perform them. UV precautions reviewed with patient. Patient was asked about new or changing moles/lesions on body.     #Reviewed Sunscreen: Apply 20 minutes prior to going outdoors and reapply every two hours, when wet or sweating. We recommend using an SPF 30 or higher, and to use one that is water resistant.       Follow-up:  1 years for follow up full body skin exam, prn for new or changing lesions or new concerns    Inessa Woo, JUAN ANTONIO CNP on 2/1/2023 at 2:14 PM    ____________________________________________    CC: Skin Check ([facility]. Hx. Of BCC)    HPI:  Mr. Colby Marx is a(n) 57 year old male who presents today as a new patient for a full body skin cancer screening. Patient has concerns today about some brown rough spots on his back, chest, arms and legs. They are asymptomatic     Patient is otherwise feeling well, without additional skin concerns.     Physical Exam:  Vitals: There were no vitals taken for this visit.  SKIN: Total skin excluding the genitalia areas was performed. The exam included the head/face, neck, both arms, chest, back, abdomen, both legs,  digits, mons pubis, buttock and nails.   -Right upper chest, well-healed circular scar without signs of erythema or recurrence of malignancy  -several 1-2mm red dome shaped symmetric papules scattered on the trunk  -multiple tan/brown flat round macules and raised papules scattered throughout trunk, extremities and head. No worrisome features for malignancy noted on examination.  -scattered tan, homogenous macules scattered on sun exposed areas of trunk, extremities and face.   -scattered waxy, stuck on tan/brown papules and patches on the trunk, face, scalp, and extremities    - No other lesions of concern on areas examined.     Medications:  Current Outpatient Medications   Medication     amoxicillin (AMOXIL) 875 MG tablet     apixaban ANTICOAGULANT (ELIQUIS) 5 MG tablet     calcium carbonate (TUMS) 500 MG chewable tablet     Ginkgo Biloba (GINKOBA PO)     Probiotic Product (PROBIOTIC DAILY PO)     STATIN NOT PRESCRIBED, INTENTIONAL,     valACYclovir (VALTREX) 500 MG tablet     No current facility-administered medications for this visit.      Past Medical History:   Patient Active Problem List   Diagnosis     Gastroesophageal reflux disease     NSTEMI (non-ST elevated myocardial infarction) (H)     Family history of coronary artery disease     Sleep apnea     Paroxysmal atrial fibrillation (H)     Screening for hyperlipidemia     Past Medical History:   Diagnosis Date     Family history of abdominal aortic aneurysm      Family history of coronary artery disease     sister had SCAD episode     Fatigue      Gastro-oesophageal reflux disease      NSTEMI (non-ST elevated myocardial infarction) (H)     Aug 2015, cardiac cath: normal coronaries     Paroxysmal atrial fibrillation (H)      Sleep apnea        CC Referred Self, MD  No address on file on close of this encounter.

## 2023-08-26 DIAGNOSIS — B00.9 HERPES SIMPLEX VIRUS INFECTION: ICD-10-CM

## 2023-08-28 RX ORDER — VALACYCLOVIR HYDROCHLORIDE 500 MG/1
500 TABLET, FILM COATED ORAL DAILY
Qty: 90 TABLET | Refills: 3 | Status: SHIPPED | OUTPATIENT
Start: 2023-08-28 | End: 2024-07-17

## 2023-09-05 ENCOUNTER — TELEPHONE (OUTPATIENT)
Dept: CARDIOLOGY | Facility: CLINIC | Age: 58
End: 2023-09-05
Payer: COMMERCIAL

## 2023-09-05 NOTE — TELEPHONE ENCOUNTER
Ip, MD Jonel Amor William W. RN  Caller: Unspecified (Today, 11:20 AM)  Yes should be on oral anticoagulation indefinitely.   Thanks.

## 2023-09-05 NOTE — TELEPHONE ENCOUNTER
I called patient back to say that Dr. Perea does want him on Eliquis indefinitely.    Patient states that in January of this year his co pay for Eliquis was 30.00 for 3 months.    With the Good Rx and other co pay cards it brought the cost down from 2200.00/ 3 months to 4640-9673.    Patient will not be using this insurance next year but would like to know if there are any other resources out there that can help him with the expense.    I told him that I will have our pharmacy team review this and then get back to him with their recommendations.

## 2023-09-05 NOTE — TELEPHONE ENCOUNTER
I called patient to discuss his concerns about the expense of Eliquis.    He currently has a non traditional insurance plan called [employer]. In the past, he had traditional insurance carriers where the cost of Eliquis was affordable.With his current plan he can use coupon cards.However, the cost for a GoodRx card brings his Eliquis to 1800.00 for three months!    He is asking if he really needs to be on this.    In 2015 he had an MI but normal coronaries.He was later diagnosed with PAF and it was presumed he had a thromboembolic event from atrial fibrillation that led to his infarct.    A Ziopatch in 2015 and 48 hr holter in 2021 did not reveal any afib. He has not noticed any episodes of afib He does not have an Apple watch but the afib gives him a fluttering sensation.    His CHADS2-Vasc score is probably 1 given his vascular issue.No HTN,diabetes, or heart failure.      I told him that I will have Dr. Perea review whether he wants him to remain indefinably on Eliquis and if so, we will reach out to our pharmacy liaison for financial resources

## 2023-09-05 NOTE — TELEPHONE ENCOUNTER
[facility] Call Center    Phone Message    May a detailed message be left on voicemail: yes     Reason for Call: Other: Pt would like to discuss alternatives to Eliquis due to the cost?  Maybe going back to the Asprin/Baby Asprin instead?  Pt has not had an afib episode for years. Please call to discuss options     Action Taken: Message routed to:  [facility] ([facility] cardio    Travel Screening: Not Applicable    Thank you!  [facility]

## 2023-09-06 NOTE — TELEPHONE ENCOUNTER
I just tried a $10 Eliquis copay card and it worked!    Patient should register for a copay card at [employer] and indicate he has commercial insurance.    Aziza Case  Pharmacy Technician/Liaison, Discharge Pharmacy   [phone]  [email or web address]

## 2023-09-07 NOTE — TELEPHONE ENCOUNTER
Left patient a phone message indicating he needs to register at [employer] to get a 10.00 copay and and in the application state that he has commercial insurance

## 2023-09-08 NOTE — TELEPHONE ENCOUNTER
"Prior Authorization Not Processed    Medication: ELIQUIS 5 MG PO TABS  Insurance Company: [employer] - Phone [phone] Fax [phone]  Expected CoPay:      Pharmacy Filling the Rx: [id number] - [place] - [address] AT [facility] [address] [place] [address]  Pharmacy Notified:    Patient Notified:      Per Medi share they are not an insurance company.  Typically only Medicare plans offer tier exceptions and with this plan it looks likes per medi share website the \"card\" provides them with good rx discounts         PA team is unable to lower cost of the medication.  It appears the patient can try to get the cost shared with other members in the plan for 6 months.  It sounds like all patients who use this plan donate money every month for other members medical expenses.       "

## 2024-03-06 ENCOUNTER — OFFICE VISIT (OUTPATIENT)
Dept: CARDIOLOGY | Facility: CLINIC | Age: 59
End: 2024-03-06
Payer: COMMERCIAL

## 2024-03-06 VITALS
SYSTOLIC BLOOD PRESSURE: 121 MMHG | WEIGHT: 209.5 LBS | HEIGHT: 76 IN | BODY MASS INDEX: 25.51 KG/M2 | DIASTOLIC BLOOD PRESSURE: 75 MMHG | HEART RATE: 71 BPM

## 2024-03-06 DIAGNOSIS — I48.0 PAROXYSMAL ATRIAL FIBRILLATION (H): Primary | ICD-10-CM

## 2024-03-06 DIAGNOSIS — I21.4 NSTEMI (NON-ST ELEVATED MYOCARDIAL INFARCTION) (H): ICD-10-CM

## 2024-03-06 PROCEDURE — 99214 OFFICE O/P EST MOD 30 MIN: CPT | Performed by: INTERNAL MEDICINE

## 2024-03-06 NOTE — LETTER
3/6/2024    Jagdeep Jennings DO  [facility] [place] [address] [place] [address]    RE: Colby VAUGHAN        Dear Colleague,     I had the pleasure of seeing Colby Marx in the [facility].  HPI and Plan:   It is my pleasure to see this very pleasant 58 year-old gentleman for follow-up of non-ST elevation MI and paroxysmal atrial fibrillation.     The MI occurred in 2015.  He was under a lot of stress at that time.  Coronary arteries were normal as was his LVEF.  His coronary angiography was performed by my good friend and colleague Dr. Pepe Chandler.  However MRI showed evidence of a prior inferior infarction.  He was later diagnosed with paroxysmal atrial fibrillation and it is presumed that he had thromboembolic episode from PAF leading to the infarction.  He has since been on Eliquis which he tolerates well.      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  I am happy to hear that he remains well with no cardiac symptoms at all.    2023 he had a coronary calcium score and I am very happy to see that it is serial.    Cardiac examination continues to be unremarkable.    We had a nice chat today about his cardiac issues.  In my mind it is a little bit unclear as to why he had the myocardial infarction but it does appear that paroxysmal atrial fibrillation with coronary embolism is the most likely mechanism.  As Eliquis is very well-tolerated would generally suggest continuing with this medication.  When he is on Eliquis there is no need for him to be on a baby aspirin    As we think coronary embolism is a likely cause of his small myocardial infarction and he has a calcium score of 0 there is no need for him to be on a statin.    Incidentally noted is an excellent blood pressure of 121/75.    I look forward see him again in 1 years time for continued follow-up.    Orders Placed This Encounter   Procedures    Follow-Up with  Cardiology       No orders of the defined types were placed in this encounter.      Encounter Diagnoses   Name Primary?    Paroxysmal atrial fibrillation (H) Yes    NSTEMI (non-ST elevated myocardial infarction) (H)        CURRENT MEDICATIONS:  Current Outpatient Medications   Medication Sig Dispense Refill    apixaban ANTICOAGULANT (ELIQUIS) 5 MG tablet Take 1 tablet (5 mg) by mouth 2 times daily 180 tablet 3    Ginkgo Biloba (GINKOBA PO) Take 120 mg by mouth daily      valACYclovir (VALTREX) 500 MG tablet TAKE 1 TABLET(500 MG) BY MOUTH DAILY 90 tablet 3    STATIN NOT PRESCRIBED, INTENTIONAL, Statin not prescribed intentionally due to normal coronary arteries and does not have significant elevated LDL.         ALLERGIES   No Known Allergies    PAST MEDICAL HISTORY:  Past Medical History:   Diagnosis Date    Family history of abdominal aortic aneurysm     Family history of coronary artery disease     sister had SCAD episode    Fatigue     Gastro-oesophageal reflux disease     NSTEMI (non-ST elevated myocardial infarction) (H)     Aug 2015, cardiac cath: normal coronaries    Paroxysmal atrial fibrillation (H)     Sleep apnea        PAST SURGICAL HISTORY:  Past Surgical History:   Procedure Laterality Date    ELBOW SURGERY         FAMILY HISTORY:  Family History   Problem Relation Age of Onset    Coronary Artery Disease Mother 80        CABG    Cerebrovascular Disease Mother         TIA    Coronary Artery Disease Father 56        MI    Coronary Artery Disease Sister 59        MI    Arrhythmia Brother     Coronary Artery Disease Sister     No Known Problems Sister        SOCIAL HISTORY:  Social History     Socioeconomic History    Marital status: Single     Spouse name: None    Number of children: None    Years of education: None    Highest education level: None   Tobacco Use    Smoking status: Never    Smokeless tobacco: Never   Substance and Sexual Activity    Alcohol use: Yes     Comment: rare    Drug use: No  "  Other Topics Concern    Parent/sibling w/ CABG, MI or angioplasty before 65F 55M? Yes    Caffeine Concern No    Special Diet Yes     Comment: Gluten and dairy free    Exercise Yes     Comment: 2-3 times week, pilates, ellyptical and weights, yoga    Seat Belt Yes       Review of Systems:  Skin:        Eyes:       ENT:       Respiratory:  Negative    Cardiovascular:  Negative    Gastroenterology:      Genitourinary:       Musculoskeletal:       Neurologic:       Psychiatric:       Heme/Lymph/Imm:  Negative    Endocrine:  Negative      Physical Exam:  Vitals: /75   Pulse 71   Ht 1.93 m (6' 4\")   Wt 95 kg (209 lb 8 oz)   BMI 25.50 kg/m      Constitutional:  cooperative, alert and oriented, well developed, well nourished, in no acute distress        Skin:  warm and dry to the touch, no apparent skin lesions or masses noted          Head:  normocephalic, no masses or lesions        Eyes:           Lymph:No Cervical lymphadenopathy present     ENT:  no pallor or cyanosis, dentition good        Neck:  carotid pulses are full and equal bilaterally, JVP normal, no carotid bruit        Respiratory:  normal breath sounds, clear to auscultation, normal A-P diameter, normal symmetry, normal respiratory excursion, no use of accessory muscles         Cardiac: regular rhythm, normal S1/S2, no S3 or S4, apical impulse not displaced, no murmurs, gallops or rubs                pulses full and equal, no bruits auscultated                                        GI:  abdomen soft, non-tender, BS normoactive, no mass, no HSM, no bruits        Extremities and Muscular Skeletal:  no edema;no deformities, clubbing, cyanosis, erythema observed              Neurological:  no gross motor deficits;affect appropriate        Psych:  Alert and Oriented x 3        Recent Lab Results:  LIPID RESULTS:  Lab Results   Component Value Date    CHOL 124 07/31/2017    HDL 33 07/31/2017    LDL 79 07/31/2017    TRIG 59 07/31/2017       LIVER " "ENZYME RESULTS:  Lab Results   Component Value Date    AST 23 01/08/2016    ALT 40 01/08/2016       CBC RESULTS:  Lab Results   Component Value Date    WBC 5.4 07/23/2021    WBC 9.0 05/08/2018    RBC 4.57 07/23/2021    RBC 4.58 05/08/2018    HGB 15.1 03/07/2022    HGB 15.2 05/08/2018    HCT 43.7 07/23/2021    HCT 43.9 05/08/2018    MCV 96 07/23/2021    MCV 96 05/08/2018    MCH 31.9 07/23/2021    MCH 33.2 (H) 05/08/2018    MCHC 33.4 07/23/2021    MCHC 34.6 05/08/2018    RDW 12.2 07/23/2021    RDW 12.5 05/08/2018     07/23/2021     05/08/2018       BMP RESULTS:  Lab Results   Component Value Date     03/07/2022     05/08/2018    POTASSIUM 4.5 03/07/2022    POTASSIUM 3.6 05/08/2018    CHLORIDE 108 03/07/2022    CHLORIDE 108 05/08/2018    CO2 27 03/07/2022    CO2 27 05/08/2018    ANIONGAP 5 03/07/2022    ANIONGAP 8 05/08/2018     (H) 03/07/2022     (H) 05/08/2018    BUN 17 03/07/2022    BUN 17 05/08/2018    CR 1.11 03/07/2022    CR 1.13 05/08/2018    GFRESTIMATED 77 03/07/2022    GFRESTIMATED 67 05/08/2018    GFRESTBLACK 82 05/08/2018    NICOLETTE 9.3 03/07/2022    NICOLETTE 9.1 05/08/2018        A1C RESULTS:  No results found for: \"A1C\"    INR RESULTS:  No results found for: \"INR\"        CC  Herbert Perea MD  [address]  [place] [id number]                   HPI and Plan:   See dictation    No orders of the defined types were placed in this encounter.      No orders of the defined types were placed in this encounter.      Encounter Diagnoses   Name Primary?    Paroxysmal atrial fibrillation (H) Yes    NSTEMI (non-ST elevated myocardial infarction) (H)        CURRENT MEDICATIONS:  Current Outpatient Medications   Medication Sig Dispense Refill    apixaban ANTICOAGULANT (ELIQUIS) 5 MG tablet Take 1 tablet (5 mg) by mouth 2 times daily 180 tablet 3    Ginkgo Biloba (GINKOBA PO) Take 120 mg by mouth daily      valACYclovir (VALTREX) 500 MG tablet TAKE 1 TABLET(500 MG) BY MOUTH " DAILY 90 tablet 3    STATIN NOT PRESCRIBED, INTENTIONAL, Statin not prescribed intentionally due to normal coronary arteries and does not have significant elevated LDL.         ALLERGIES   No Known Allergies    PAST MEDICAL HISTORY:  Past Medical History:   Diagnosis Date    Family history of abdominal aortic aneurysm     Family history of coronary artery disease     sister had SCAD episode    Fatigue     Gastro-oesophageal reflux disease     NSTEMI (non-ST elevated myocardial infarction) (H)     Aug 2015, cardiac cath: normal coronaries    Paroxysmal atrial fibrillation (H)     Sleep apnea        PAST SURGICAL HISTORY:  Past Surgical History:   Procedure Laterality Date    ELBOW SURGERY         FAMILY HISTORY:  Family History   Problem Relation Age of Onset    Coronary Artery Disease Mother 80        CABG    Cerebrovascular Disease Mother         TIA    Coronary Artery Disease Father 56        MI    Coronary Artery Disease Sister 59        MI    Arrhythmia Brother     Coronary Artery Disease Sister     No Known Problems Sister        SOCIAL HISTORY:  Social History     Socioeconomic History    Marital status: Single     Spouse name: None    Number of children: None    Years of education: None    Highest education level: None   Tobacco Use    Smoking status: Never    Smokeless tobacco: Never   Substance and Sexual Activity    Alcohol use: Yes     Comment: rare    Drug use: No   Other Topics Concern    Parent/sibling w/ CABG, MI or angioplasty before 65F 55M? Yes    Caffeine Concern No    Special Diet Yes     Comment: Gluten and dairy free    Exercise Yes     Comment: 2-3 times week, pilates, ellyptical and weights, yoga    Seat Belt Yes       Review of Systems:  Skin:        Eyes:       ENT:       Respiratory:  Negative    Cardiovascular:  Negative    Gastroenterology:      Genitourinary:       Musculoskeletal:       Neurologic:       Psychiatric:       Heme/Lymph/Imm:  Negative    Endocrine:  Negative      Physical  "Exam:  Vitals: /75   Pulse 71   Ht 1.93 m (6' 4\")   Wt 95 kg (209 lb 8 oz)   BMI 25.50 kg/m      Constitutional:  cooperative, alert and oriented, well developed, well nourished, in no acute distress        Skin:  warm and dry to the touch, no apparent skin lesions or masses noted          Head:  normocephalic, no masses or lesions        Eyes:           Lymph:No Cervical lymphadenopathy present     ENT:  no pallor or cyanosis, dentition good        Neck:  carotid pulses are full and equal bilaterally, JVP normal, no carotid bruit        Respiratory:  normal breath sounds, clear to auscultation, normal A-P diameter, normal symmetry, normal respiratory excursion, no use of accessory muscles         Cardiac: regular rhythm, normal S1/S2, no S3 or S4, apical impulse not displaced, no murmurs, gallops or rubs                pulses full and equal, no bruits auscultated                                        GI:  abdomen soft, non-tender, BS normoactive, no mass, no HSM, no bruits        Extremities and Muscular Skeletal:  no edema;no deformities, clubbing, cyanosis, erythema observed              Neurological:  no gross motor deficits;affect appropriate        Psych:  Alert and Oriented x 3        Recent Lab Results:  LIPID RESULTS:  Lab Results   Component Value Date    CHOL 124 07/31/2017    HDL 33 07/31/2017    LDL 79 07/31/2017    TRIG 59 07/31/2017       LIVER ENZYME RESULTS:  Lab Results   Component Value Date    AST 23 01/08/2016    ALT 40 01/08/2016       CBC RESULTS:  Lab Results   Component Value Date    WBC 5.4 07/23/2021    WBC 9.0 05/08/2018    RBC 4.57 07/23/2021    RBC 4.58 05/08/2018    HGB 15.1 03/07/2022    HGB 15.2 05/08/2018    HCT 43.7 07/23/2021    HCT 43.9 05/08/2018    MCV 96 07/23/2021    MCV 96 05/08/2018    MCH 31.9 07/23/2021    MCH 33.2 (H) 05/08/2018    MCHC 33.4 07/23/2021    MCHC 34.6 05/08/2018    RDW 12.2 07/23/2021    RDW 12.5 05/08/2018     07/23/2021     " "05/08/2018       BMP RESULTS:  Lab Results   Component Value Date     03/07/2022     05/08/2018    POTASSIUM 4.5 03/07/2022    POTASSIUM 3.6 05/08/2018    CHLORIDE 108 03/07/2022    CHLORIDE 108 05/08/2018    CO2 27 03/07/2022    CO2 27 05/08/2018    ANIONGAP 5 03/07/2022    ANIONGAP 8 05/08/2018     (H) 03/07/2022     (H) 05/08/2018    BUN 17 03/07/2022    BUN 17 05/08/2018    CR 1.11 03/07/2022    CR 1.13 05/08/2018    GFRESTIMATED 77 03/07/2022    GFRESTIMATED 67 05/08/2018    GFRESTBLACK 82 05/08/2018    NICOLETTE 9.3 03/07/2022    NICOLETTE 9.1 05/08/2018        A1C RESULTS:  No results found for: \"A1C\"    INR RESULTS:  No results found for: \"INR\"        CC  Hilario Perea MD  [address]  [place] [address]      Thank you for allowing me to participate in the care of your patient.      Sincerely,     DR HILARIO PEREA MD     [facility]  "

## 2024-03-06 NOTE — PROGRESS NOTES
HPI and Plan:   It is my pleasure to see this very pleasant 58 year-old gentleman for follow-up of non-ST elevation MI and paroxysmal atrial fibrillation.     The MI occurred in 2015.  He was under a lot of stress at that time.  Coronary arteries were normal as was his LVEF.  His coronary angiography was performed by my good friend and colleague Dr. Pepe Chandler.  However MRI showed evidence of a prior inferior infarction.  He was later diagnosed with paroxysmal atrial fibrillation and it is presumed that he had thromboembolic episode from PAF leading to the infarction.  He has since been on Eliquis which he tolerates well.      He is in the [employer] business, job which he enjoys and he has a lot of traveling to do.  I am happy to hear that he remains well with no cardiac symptoms at all.    2023 he had a coronary calcium score and I am very happy to see that it is serial.    Cardiac examination continues to be unremarkable.    We had a nice chat today about his cardiac issues.  In my mind it is a little bit unclear as to why he had the myocardial infarction but it does appear that paroxysmal atrial fibrillation with coronary embolism is the most likely mechanism.  As Eliquis is very well-tolerated would generally suggest continuing with this medication.  When he is on Eliquis there is no need for him to be on a baby aspirin    As we think coronary embolism is a likely cause of his small myocardial infarction and he has a calcium score of 0 there is no need for him to be on a statin.    Incidentally noted is an excellent blood pressure of 121/75.    I look forward see him again in 1 years time for continued follow-up.    Orders Placed This Encounter   Procedures    Follow-Up with Cardiology       No orders of the defined types were placed in this encounter.      Encounter Diagnoses   Name Primary?    Paroxysmal atrial fibrillation (H) Yes    NSTEMI (non-ST elevated myocardial infarction) (H)        CURRENT  MEDICATIONS:  Current Outpatient Medications   Medication Sig Dispense Refill    apixaban ANTICOAGULANT (ELIQUIS) 5 MG tablet Take 1 tablet (5 mg) by mouth 2 times daily 180 tablet 3    Ginkgo Biloba (GINKOBA PO) Take 120 mg by mouth daily      valACYclovir (VALTREX) 500 MG tablet TAKE 1 TABLET(500 MG) BY MOUTH DAILY 90 tablet 3    STATIN NOT PRESCRIBED, INTENTIONAL, Statin not prescribed intentionally due to normal coronary arteries and does not have significant elevated LDL.         ALLERGIES   No Known Allergies    PAST MEDICAL HISTORY:  Past Medical History:   Diagnosis Date    Family history of abdominal aortic aneurysm     Family history of coronary artery disease     sister had SCAD episode    Fatigue     Gastro-oesophageal reflux disease     NSTEMI (non-ST elevated myocardial infarction) (H)     Aug 2015, cardiac cath: normal coronaries    Paroxysmal atrial fibrillation (H)     Sleep apnea        PAST SURGICAL HISTORY:  Past Surgical History:   Procedure Laterality Date    ELBOW SURGERY         FAMILY HISTORY:  Family History   Problem Relation Age of Onset    Coronary Artery Disease Mother 80        CABG    Cerebrovascular Disease Mother         TIA    Coronary Artery Disease Father 56        MI    Coronary Artery Disease Sister 59        MI    Arrhythmia Brother     Coronary Artery Disease Sister     No Known Problems Sister        SOCIAL HISTORY:  Social History     Socioeconomic History    Marital status: Single     Spouse name: None    Number of children: None    Years of education: None    Highest education level: None   Tobacco Use    Smoking status: Never    Smokeless tobacco: Never   Substance and Sexual Activity    Alcohol use: Yes     Comment: rare    Drug use: No   Other Topics Concern    Parent/sibling w/ CABG, MI or angioplasty before 65F 55M? Yes    Caffeine Concern No    Special Diet Yes     Comment: Gluten and dairy free    Exercise Yes     Comment: 2-3 times week, pilates, ellyptical and  "weights, yoga    Seat Belt Yes       Review of Systems:  Skin:        Eyes:       ENT:       Respiratory:  Negative    Cardiovascular:  Negative    Gastroenterology:      Genitourinary:       Musculoskeletal:       Neurologic:       Psychiatric:       Heme/Lymph/Imm:  Negative    Endocrine:  Negative      Physical Exam:  Vitals: /75   Pulse 71   Ht 1.93 m (6' 4\")   Wt 95 kg (209 lb 8 oz)   BMI 25.50 kg/m      Constitutional:  cooperative, alert and oriented, well developed, well nourished, in no acute distress        Skin:  warm and dry to the touch, no apparent skin lesions or masses noted          Head:  normocephalic, no masses or lesions        Eyes:           Lymph:No Cervical lymphadenopathy present     ENT:  no pallor or cyanosis, dentition good        Neck:  carotid pulses are full and equal bilaterally, JVP normal, no carotid bruit        Respiratory:  normal breath sounds, clear to auscultation, normal A-P diameter, normal symmetry, normal respiratory excursion, no use of accessory muscles         Cardiac: regular rhythm, normal S1/S2, no S3 or S4, apical impulse not displaced, no murmurs, gallops or rubs                pulses full and equal, no bruits auscultated                                        GI:  abdomen soft, non-tender, BS normoactive, no mass, no HSM, no bruits        Extremities and Muscular Skeletal:  no edema;no deformities, clubbing, cyanosis, erythema observed              Neurological:  no gross motor deficits;affect appropriate        Psych:  Alert and Oriented x 3        Recent Lab Results:  LIPID RESULTS:  Lab Results   Component Value Date    CHOL 124 07/31/2017    HDL 33 07/31/2017    LDL 79 07/31/2017    TRIG 59 07/31/2017       LIVER ENZYME RESULTS:  Lab Results   Component Value Date    AST 23 01/08/2016    ALT 40 01/08/2016       CBC RESULTS:  Lab Results   Component Value Date    WBC 5.4 07/23/2021    WBC 9.0 05/08/2018    RBC 4.57 07/23/2021    RBC 4.58 05/08/2018 " "   HGB 15.1 03/07/2022    HGB 15.2 05/08/2018    HCT 43.7 07/23/2021    HCT 43.9 05/08/2018    MCV 96 07/23/2021    MCV 96 05/08/2018    MCH 31.9 07/23/2021    MCH 33.2 (H) 05/08/2018    MCHC 33.4 07/23/2021    MCHC 34.6 05/08/2018    RDW 12.2 07/23/2021    RDW 12.5 05/08/2018     07/23/2021     05/08/2018       BMP RESULTS:  Lab Results   Component Value Date     03/07/2022     05/08/2018    POTASSIUM 4.5 03/07/2022    POTASSIUM 3.6 05/08/2018    CHLORIDE 108 03/07/2022    CHLORIDE 108 05/08/2018    CO2 27 03/07/2022    CO2 27 05/08/2018    ANIONGAP 5 03/07/2022    ANIONGAP 8 05/08/2018     (H) 03/07/2022     (H) 05/08/2018    BUN 17 03/07/2022    BUN 17 05/08/2018    CR 1.11 03/07/2022    CR 1.13 05/08/2018    GFRESTIMATED 77 03/07/2022    GFRESTIMATED 67 05/08/2018    GFRESTBLACK 82 05/08/2018    NICOLETTE 9.3 03/07/2022    NICOLETTE 9.1 05/08/2018        A1C RESULTS:  No results found for: \"A1C\"    INR RESULTS:  No results found for: \"INR\"        CC  Herbert Perea MD  [address]  [place] [id number]                 "

## 2024-03-06 NOTE — PROGRESS NOTES
HPI and Plan:   See dictation    No orders of the defined types were placed in this encounter.      No orders of the defined types were placed in this encounter.      Encounter Diagnoses   Name Primary?    Paroxysmal atrial fibrillation (H) Yes    NSTEMI (non-ST elevated myocardial infarction) (H)        CURRENT MEDICATIONS:  Current Outpatient Medications   Medication Sig Dispense Refill    apixaban ANTICOAGULANT (ELIQUIS) 5 MG tablet Take 1 tablet (5 mg) by mouth 2 times daily 180 tablet 3    Ginkgo Biloba (GINKOBA PO) Take 120 mg by mouth daily      valACYclovir (VALTREX) 500 MG tablet TAKE 1 TABLET(500 MG) BY MOUTH DAILY 90 tablet 3    STATIN NOT PRESCRIBED, INTENTIONAL, Statin not prescribed intentionally due to normal coronary arteries and does not have significant elevated LDL.         ALLERGIES   No Known Allergies    PAST MEDICAL HISTORY:  Past Medical History:   Diagnosis Date    Family history of abdominal aortic aneurysm     Family history of coronary artery disease     sister had SCAD episode    Fatigue     Gastro-oesophageal reflux disease     NSTEMI (non-ST elevated myocardial infarction) (H)     Aug 2015, cardiac cath: normal coronaries    Paroxysmal atrial fibrillation (H)     Sleep apnea        PAST SURGICAL HISTORY:  Past Surgical History:   Procedure Laterality Date    ELBOW SURGERY         FAMILY HISTORY:  Family History   Problem Relation Age of Onset    Coronary Artery Disease Mother 80        CABG    Cerebrovascular Disease Mother         TIA    Coronary Artery Disease Father 56        MI    Coronary Artery Disease Sister 59        MI    Arrhythmia Brother     Coronary Artery Disease Sister     No Known Problems Sister        SOCIAL HISTORY:  Social History     Socioeconomic History    Marital status: Single     Spouse name: None    Number of children: None    Years of education: None    Highest education level: None   Tobacco Use    Smoking status: Never    Smokeless tobacco: Never  "  Substance and Sexual Activity    Alcohol use: Yes     Comment: rare    Drug use: No   Other Topics Concern    Parent/sibling w/ CABG, MI or angioplasty before 65F 55M? Yes    Caffeine Concern No    Special Diet Yes     Comment: Gluten and dairy free    Exercise Yes     Comment: 2-3 times week, pilates, ellyptical and weights, yoga    Seat Belt Yes       Review of Systems:  Skin:        Eyes:       ENT:       Respiratory:  Negative    Cardiovascular:  Negative    Gastroenterology:      Genitourinary:       Musculoskeletal:       Neurologic:       Psychiatric:       Heme/Lymph/Imm:  Negative    Endocrine:  Negative      Physical Exam:  Vitals: /75   Pulse 71   Ht 1.93 m (6' 4\")   Wt 95 kg (209 lb 8 oz)   BMI 25.50 kg/m      Constitutional:  cooperative, alert and oriented, well developed, well nourished, in no acute distress        Skin:  warm and dry to the touch, no apparent skin lesions or masses noted          Head:  normocephalic, no masses or lesions        Eyes:           Lymph:No Cervical lymphadenopathy present     ENT:  no pallor or cyanosis, dentition good        Neck:  carotid pulses are full and equal bilaterally, JVP normal, no carotid bruit        Respiratory:  normal breath sounds, clear to auscultation, normal A-P diameter, normal symmetry, normal respiratory excursion, no use of accessory muscles         Cardiac: regular rhythm, normal S1/S2, no S3 or S4, apical impulse not displaced, no murmurs, gallops or rubs                pulses full and equal, no bruits auscultated                                        GI:  abdomen soft, non-tender, BS normoactive, no mass, no HSM, no bruits        Extremities and Muscular Skeletal:  no edema;no deformities, clubbing, cyanosis, erythema observed              Neurological:  no gross motor deficits;affect appropriate        Psych:  Alert and Oriented x 3        Recent Lab Results:  LIPID RESULTS:  Lab Results   Component Value Date    CHOL 124 " "07/31/2017    HDL 33 07/31/2017    LDL 79 07/31/2017    TRIG 59 07/31/2017       LIVER ENZYME RESULTS:  Lab Results   Component Value Date    AST 23 01/08/2016    ALT 40 01/08/2016       CBC RESULTS:  Lab Results   Component Value Date    WBC 5.4 07/23/2021    WBC 9.0 05/08/2018    RBC 4.57 07/23/2021    RBC 4.58 05/08/2018    HGB 15.1 03/07/2022    HGB 15.2 05/08/2018    HCT 43.7 07/23/2021    HCT 43.9 05/08/2018    MCV 96 07/23/2021    MCV 96 05/08/2018    MCH 31.9 07/23/2021    MCH 33.2 (H) 05/08/2018    MCHC 33.4 07/23/2021    MCHC 34.6 05/08/2018    RDW 12.2 07/23/2021    RDW 12.5 05/08/2018     07/23/2021     05/08/2018       BMP RESULTS:  Lab Results   Component Value Date     03/07/2022     05/08/2018    POTASSIUM 4.5 03/07/2022    POTASSIUM 3.6 05/08/2018    CHLORIDE 108 03/07/2022    CHLORIDE 108 05/08/2018    CO2 27 03/07/2022    CO2 27 05/08/2018    ANIONGAP 5 03/07/2022    ANIONGAP 8 05/08/2018     (H) 03/07/2022     (H) 05/08/2018    BUN 17 03/07/2022    BUN 17 05/08/2018    CR 1.11 03/07/2022    CR 1.13 05/08/2018    GFRESTIMATED 77 03/07/2022    GFRESTIMATED 67 05/08/2018    GFRESTBLACK 82 05/08/2018    NICOLETTE 9.3 03/07/2022    NICOLETTE 9.1 05/08/2018        A1C RESULTS:  No results found for: \"A1C\"    INR RESULTS:  No results found for: \"INR\"        CC  Herbert Perea MD  [address]  [place] [address]                 "

## 2024-04-28 ENCOUNTER — HEALTH MAINTENANCE LETTER (OUTPATIENT)
Age: 59
End: 2024-04-28

## 2024-07-17 ENCOUNTER — OFFICE VISIT (OUTPATIENT)
Dept: FAMILY MEDICINE | Facility: CLINIC | Age: 59
End: 2024-07-17
Payer: COMMERCIAL

## 2024-07-17 VITALS
HEART RATE: 59 BPM | OXYGEN SATURATION: 98 % | HEIGHT: 75 IN | DIASTOLIC BLOOD PRESSURE: 62 MMHG | BODY MASS INDEX: 25.69 KG/M2 | TEMPERATURE: 97.6 F | WEIGHT: 206.6 LBS | SYSTOLIC BLOOD PRESSURE: 100 MMHG | RESPIRATION RATE: 20 BRPM

## 2024-07-17 DIAGNOSIS — Z13.220 SCREENING FOR HYPERLIPIDEMIA: ICD-10-CM

## 2024-07-17 DIAGNOSIS — Z00.00 ENCOUNTER FOR ANNUAL PHYSICAL EXAM: ICD-10-CM

## 2024-07-17 DIAGNOSIS — Z12.5 SCREENING FOR PROSTATE CANCER: ICD-10-CM

## 2024-07-17 DIAGNOSIS — Z11.4 SCREENING FOR HIV (HUMAN IMMUNODEFICIENCY VIRUS): Primary | ICD-10-CM

## 2024-07-17 DIAGNOSIS — B00.9 HERPES SIMPLEX VIRUS INFECTION: ICD-10-CM

## 2024-07-17 DIAGNOSIS — Z11.59 NEED FOR HEPATITIS C SCREENING TEST: ICD-10-CM

## 2024-07-17 DIAGNOSIS — I48.0 PAROXYSMAL ATRIAL FIBRILLATION (H): ICD-10-CM

## 2024-07-17 PROCEDURE — 36415 COLL VENOUS BLD VENIPUNCTURE: CPT | Performed by: FAMILY MEDICINE

## 2024-07-17 PROCEDURE — G0103 PSA SCREENING: HCPCS | Performed by: FAMILY MEDICINE

## 2024-07-17 PROCEDURE — 86803 HEPATITIS C AB TEST: CPT | Performed by: FAMILY MEDICINE

## 2024-07-17 PROCEDURE — 80053 COMPREHEN METABOLIC PANEL: CPT | Performed by: FAMILY MEDICINE

## 2024-07-17 PROCEDURE — 80061 LIPID PANEL: CPT | Performed by: FAMILY MEDICINE

## 2024-07-17 PROCEDURE — 87389 HIV-1 AG W/HIV-1&-2 AB AG IA: CPT | Performed by: FAMILY MEDICINE

## 2024-07-17 PROCEDURE — 99396 PREV VISIT EST AGE 40-64: CPT | Performed by: FAMILY MEDICINE

## 2024-07-17 RX ORDER — VALACYCLOVIR HYDROCHLORIDE 500 MG/1
500 TABLET, FILM COATED ORAL DAILY
Qty: 90 TABLET | Refills: 3 | Status: SHIPPED | OUTPATIENT
Start: 2024-07-17

## 2024-07-17 SDOH — HEALTH STABILITY: PHYSICAL HEALTH: ON AVERAGE, HOW MANY DAYS PER WEEK DO YOU ENGAGE IN MODERATE TO STRENUOUS EXERCISE (LIKE A BRISK WALK)?: 3 DAYS

## 2024-07-17 SDOH — HEALTH STABILITY: PHYSICAL HEALTH: ON AVERAGE, HOW MANY MINUTES DO YOU ENGAGE IN EXERCISE AT THIS LEVEL?: 60 MIN

## 2024-07-17 ASSESSMENT — SOCIAL DETERMINANTS OF HEALTH (SDOH): HOW OFTEN DO YOU GET TOGETHER WITH FRIENDS OR RELATIVES?: MORE THAN THREE TIMES A WEEK

## 2024-07-17 ASSESSMENT — PAIN SCALES - GENERAL: PAINLEVEL: NO PAIN (0)

## 2024-07-17 NOTE — LETTER
July 19, 2024      Colby Marx  [address] [facility] [address]        Dear ,    We are writing to inform you of your test results.    I have reviewed your recent labs. Here are the results:   -cholesterol number when compared to last time slight worse, with slight high LDL , bad cholesterol. We will continue to monitor at thi time.diet and regular exercise would be helpful.   -PSA (prostate specific antigen) test is normal.  This indicates a low likelihood of prostate cancer.  ADVISE: rechecking this in 1 year.   -Liver and gallbladder tests are normal (ALT,AST, Alk phos, bilirubin), kidney function is normal (Cr, GFR), sodium is normal, potassium is normal, calcium is normal, glucose is not in diabetic range.   -HIV test is normal.     Resulted Orders   HIV Antigen Antibody Combo   Result Value Ref Range    HIV Antigen Antibody Combo Nonreactive Nonreactive      Comment:      Negative HIV-1 p24 antigen and HIV-1/2 antibody screening test results usually indicate the absence of HIV-1 and HIV-2 infection. However, such negative results do not rule-out acute HIV infection.  If acute HIV-1 or HIV-2 infection is suspected, detection of HIV-1 or HIV-2 RNA  is recommended.    Hepatitis C Screen Reflex to HCV RNA Quant and Genotype   Result Value Ref Range    Hepatitis C Antibody Nonreactive Nonreactive      Comment:      A nonreactive screening test result does not exclude the possibility of exposure to or infection with HCV. Nonreactive screening test results in individuals with prior exposure to HCV may be due to antibody levels below the limit of detection of this assay or lack of reactivity to the HCV antigens used in this assay. Patients with recent HCV infections (<3 months from time of exposure) may have false-negative HCV antibody results due to the time needed for seroconversion (average of 8 to 9 weeks).   Lipid panel reflex to direct LDL Fasting   Result Value Ref Range    Cholesterol 168  <200 mg/dL    Triglycerides 71 <150 mg/dL    Direct Measure HDL 37 (L) >=40 mg/dL    LDL Cholesterol Calculated 117 (H) <=100 mg/dL    Non HDL Cholesterol 131 (H) <130 mg/dL    Patient Fasting > 8hrs? Yes     Narrative    Cholesterol  Desirable:  <200 mg/dL    Triglycerides  Normal:  Less than 150 mg/dL  Borderline High:  150-199 mg/dL  High:  200-499 mg/dL  Very High:  Greater than or equal to 500 mg/dL    Direct Measure HDL  Female:  Greater than or equal to 50 mg/dL   Male:  Greater than or equal to 40 mg/dL    LDL Cholesterol  Desirable:  <100mg/dL  Above Desirable:  100-129 mg/dL   Borderline High:  130-159 mg/dL   High:  160-189 mg/dL   Very High:  >= 190 mg/dL    Non HDL Cholesterol  Desirable:  130 mg/dL  Above Desirable:  130-159 mg/dL  Borderline High:  160-189 mg/dL  High:  190-219 mg/dL  Very High:  Greater than or equal to 220 mg/dL   Prostate Specific Antigen Screen   Result Value Ref Range    Prostate Specific Antigen Screen 0.55 0.00 - 4.50 ng/mL    Narrative    This result is obtained using the Roche Elecsys total PSA method on the flo [id number] immunoassay analyzer. Results obtained with different assay methods or kits cannot be used interchangeably.   Comprehensive metabolic panel   Result Value Ref Range    Sodium 140 135 - 145 mmol/L    Potassium 4.7 3.4 - 5.3 mmol/L    Carbon Dioxide (CO2) 26 22 - 29 mmol/L    Anion Gap 7 7 - 15 mmol/L    Urea Nitrogen 16.8 8.0 - 23.0 mg/dL    Creatinine 1.09 0.67 - 1.17 mg/dL    GFR Estimate 77 >60 mL/min/1.73m2      Comment:      eGFR calculated using 2021 CKD-EPI equation.    Calcium 9.1 8.8 - 10.4 mg/dL      Comment:      Reference intervals for this test were updated on 7/16/2024 to reflect our healthy population more accurately. There may be differences in the flagging of prior results with similar values performed with this method. Those prior results can be interpreted in the context of the updated reference intervals.    Chloride 107 98 - 107 mmol/L     Glucose 101 (H) 70 - 99 mg/dL    Alkaline Phosphatase 77 40 - 150 U/L    AST 26 0 - 45 U/L    ALT 29 0 - 70 U/L    Protein Total 6.8 6.4 - 8.3 g/dL    Albumin 4.2 3.5 - 5.2 g/dL    Bilirubin Total 0.3 <=1.2 mg/dL    Patient Fasting > 8hrs? Yes        If you have any questions or concerns, please call the clinic at the number listed above.       Sincerely,      Lon Redd MD

## 2024-07-17 NOTE — PROGRESS NOTES
"Preventive Care Visit  [facility] MARCIAL Redd MD, Family Medicine  Jul 17, 2024      Assessment & Plan     Encounter for annual physical exam    - HIV Antigen Antibody Combo; Future  - Hepatitis C Screen Reflex to HCV RNA Quant and Genotype; Future  - Lipid panel reflex to direct LDL Fasting; Future  - Prostate Specific Antigen Screen; Future  - Comprehensive metabolic panel; Future  - HIV Antigen Antibody Combo  - Hepatitis C Screen Reflex to HCV RNA Quant and Genotype  - Lipid panel reflex to direct LDL Fasting  - Prostate Specific Antigen Screen  - Comprehensive metabolic panel    Screening for HIV (human immunodeficiency virus)    - HIV Antigen Antibody Combo; Future  - HIV Antigen Antibody Combo    Need for hepatitis C screening test    - Hepatitis C Screen Reflex to HCV RNA Quant and Genotype; Future  - Hepatitis C Screen Reflex to HCV RNA Quant and Genotype    Screening for hyperlipidemia    - Lipid panel reflex to direct LDL Fasting; Future  - Comprehensive metabolic panel; Future  - Lipid panel reflex to direct LDL Fasting  - Comprehensive metabolic panel    Paroxysmal atrial fibrillation (H)  Currently stable on blood thinners.  Following up with cardiology.    Screening for prostate cancer    - Prostate Specific Antigen Screen; Future  - Prostate Specific Antigen Screen    Herpes simplex virus infection  No side effects continue the suppressive therapy.  - valACYclovir (VALTREX) 500 MG tablet; Take 1 tablet (500 mg) by mouth daily    Patient has been advised of split billing requirements and indicates understanding: Yes        BMI  Estimated body mass index is 25.58 kg/m  as calculated from the following:    Height as of this encounter: 1.914 m (6' 3.35\").    Weight as of this encounter: 93.7 kg (206 lb 9.6 oz).       Counseling  Appropriate preventive services were addressed with this patient via screening, questionnaire, or discussion as appropriate for fall prevention, " nutrition, physical activity, Tobacco-use cessation, weight loss and cognition.  Checklist reviewing preventive services available has been given to the patient.  Reviewed patient's diet, addressing concerns and/or questions.   He is at risk for lack of exercise and has been provided with information to increase physical activity for the benefit of his well-being.   He is at risk for psychosocial distress and has been provided with information to reduce risk.           Joseline Bowman is a 59 year old, presenting for the following:  Physical        7/17/2024    10:47 AM   Additional Questions   Roomed by Deya SAPP        Health Care Directive  Patient does not have a Health Care Directive or Living Will: Patient states has Advance Directive and will bring in a copy to clinic.    Healthy Habits:     Getting at least 3 servings of Calcium per day:  Yes    Bi-annual eye exam:  Yes    Dental care twice a year:  Yes    Sleep apnea or symptoms of sleep apnea:  None    Diet:  Gluten-free/reduced    Frequency of exercise:  2-3 days/week    Duration of exercise:  45-60 minutes    Barriers to taking medications:  None    Medication side effects:  None    Additional concerns today:  No  Patient has underlying history of paroxysmal atrial fibrillation.  Currently on blood thinner.  Denies any concerns.  Use Valtrex for suppressive therapy for genital herpes.      7/17/2024   General Health   How would you rate your overall physical health? Excellent   Feel stress (tense, anxious, or unable to sleep) Only a little      (!) STRESS CONCERN      7/17/2024   Nutrition   Three or more servings of calcium each day? Yes   Diet: Gluten-free/reduced   How many servings of fruit and vegetables per day? (!) 2-3   How many sweetened beverages each day? 0-1            7/17/2024   Exercise   Days per week of moderate/strenous exercise 3 days   Average minutes spent exercising at this level 60 min            7/17/2024   Social Factors  "  Frequency of gathering with friends or relatives More than three times a week   Worry food won't last until get money to buy more No   Food not last or not have enough money for food? No   Do you have housing? (Housing is defined as stable permanent housing and does not include staying ouside in a car, in a tent, in an abandoned building, in an overnight shelter, or couch-surfing.) Yes   Are you worried about losing your housing? No   Lack of transportation? No   Unable to get utilities (heat,electricity)? No            7/17/2024   Fall Risk   Fallen 2 or more times in the past year? No   Trouble with walking or balance? No             7/17/2024   Dental   Dentist two times every year? Yes            7/17/2024   TB Screening   Were you born outside of the [place]? No            Today's PHQ-2 Score:       7/17/2024    10:44 AM   PHQ-2 ( 1999 [employer])   Q1: Little interest or pleasure in doing things 0   Q2: Feeling down, depressed or hopeless 0   PHQ-2 Score 0   Q1: Little interest or pleasure in doing things Not at all   Q2: Feeling down, depressed or hopeless Not at all   PHQ-2 Score 0           7/17/2024   Substance Use   Alcohol more than 3/day or more than 7/wk No   Do you use any other substances recreationally? No        Social History     Tobacco Use    Smoking status: Never    Smokeless tobacco: Never   Vaping Use    Vaping status: Never Used   Substance Use Topics    Alcohol use: Yes     Comment: rare    Drug use: No           7/17/2024   One time HIV Screening   Previous HIV test? I don't know          7/17/2024   STI Screening   New sexual partner(s) since last STI/HIV test? No      Last PSA: No results found for: \"PSA\"  ASCVD Risk   The ASCVD Risk score (Moraima GIL, et al., 2019) failed to calculate for the following reasons:    The patient has a prior MI or stroke diagnosis           Reviewed and updated as needed this visit by Provider                    Past Medical History:   Diagnosis Date    " "Family history of abdominal aortic aneurysm     Family history of coronary artery disease     sister had SCAD episode    Fatigue     Gastro-oesophageal reflux disease     NSTEMI (non-ST elevated myocardial infarction) (H)     Aug 2015, cardiac cath: normal coronaries    Paroxysmal atrial fibrillation (H)     Sleep apnea      Past Surgical History:   Procedure Laterality Date    ELBOW SURGERY           Review of Systems  CONSTITUTIONAL: NEGATIVE for fever, chills, change in weight  INTEGUMENTARY/SKIN: NEGATIVE for worrisome rashes, moles or lesions  EYES: NEGATIVE for vision changes or irritation  ENT/MOUTH: NEGATIVE for ear, mouth and throat problems  RESP: NEGATIVE for significant cough or SOB  BREAST: NEGATIVE for masses, tenderness or discharge  CV: NEGATIVE for chest pain, palpitations or peripheral edema  GI: NEGATIVE for nausea, abdominal pain, heartburn, or change in bowel habits  : NEGATIVE for frequency, dysuria, or hematuria  MUSCULOSKELETAL: NEGATIVE for significant arthralgias or myalgia  NEURO: NEGATIVE for weakness, dizziness or paresthesias  ENDOCRINE: NEGATIVE for temperature intolerance, skin/hair changes  HEME: NEGATIVE for bleeding problems  PSYCHIATRIC: NEGATIVE for changes in mood or affect     Objective    Exam  /62   Pulse 59   Temp 97.6  F (36.4  C)   Resp 20   Ht 1.914 m (6' 3.35\")   Wt 93.7 kg (206 lb 9.6 oz)   SpO2 98%   BMI 25.58 kg/m     Estimated body mass index is 25.58 kg/m  as calculated from the following:    Height as of this encounter: 1.914 m (6' 3.35\").    Weight as of this encounter: 93.7 kg (206 lb 9.6 oz).    Physical Exam  GENERAL: alert and no distress  EYES: Eyes grossly normal to inspection, PERRL and conjunctivae and sclerae normal  HENT: ear canals and TM's normal, nose and mouth without ulcers or lesions  NECK: no adenopathy, no asymmetry, masses, or scars  RESP: lungs clear to auscultation - no rales, rhonchi or wheezes  CV: regular rate and rhythm, " normal S1 S2, no S3 or S4, no murmur, click or rub, no peripheral edema  ABDOMEN: soft, nontender, no hepatosplenomegaly, no masses and bowel sounds normal  MS: no gross musculoskeletal defects noted, no edema  SKIN: no suspicious lesions or rashes  NEURO: Normal strength and tone, mentation intact and speech normal  PSYCH: mentation appears normal, affect normal/bright        Signed Electronically by: Lon Redd MD

## 2024-07-18 LAB
ALBUMIN SERPL BCG-MCNC: 4.2 G/DL (ref 3.5–5.2)
ALP SERPL-CCNC: 77 U/L (ref 40–150)
ALT SERPL W P-5'-P-CCNC: 29 U/L (ref 0–70)
ANION GAP SERPL CALCULATED.3IONS-SCNC: 7 MMOL/L (ref 7–15)
AST SERPL W P-5'-P-CCNC: 26 U/L (ref 0–45)
BILIRUB SERPL-MCNC: 0.3 MG/DL
BUN SERPL-MCNC: 16.8 MG/DL (ref 8–23)
CALCIUM SERPL-MCNC: 9.1 MG/DL (ref 8.8–10.4)
CHLORIDE SERPL-SCNC: 107 MMOL/L (ref 98–107)
CHOLEST SERPL-MCNC: 168 MG/DL
CREAT SERPL-MCNC: 1.09 MG/DL (ref 0.67–1.17)
EGFRCR SERPLBLD CKD-EPI 2021: 77 ML/MIN/1.73M2
FASTING STATUS PATIENT QL REPORTED: YES
FASTING STATUS PATIENT QL REPORTED: YES
GLUCOSE SERPL-MCNC: 101 MG/DL (ref 70–99)
HCO3 SERPL-SCNC: 26 MMOL/L (ref 22–29)
HCV AB SERPL QL IA: NONREACTIVE
HDLC SERPL-MCNC: 37 MG/DL
HIV 1+2 AB+HIV1 P24 AG SERPL QL IA: NONREACTIVE
LDLC SERPL CALC-MCNC: 117 MG/DL
NONHDLC SERPL-MCNC: 131 MG/DL
POTASSIUM SERPL-SCNC: 4.7 MMOL/L (ref 3.4–5.3)
PROT SERPL-MCNC: 6.8 G/DL (ref 6.4–8.3)
PSA SERPL DL<=0.01 NG/ML-MCNC: 0.55 NG/ML (ref 0–4.5)
SODIUM SERPL-SCNC: 140 MMOL/L (ref 135–145)
TRIGL SERPL-MCNC: 71 MG/DL

## 2024-08-21 ENCOUNTER — OFFICE VISIT (OUTPATIENT)
Dept: DERMATOLOGY | Facility: CLINIC | Age: 59
End: 2024-08-21
Payer: COMMERCIAL

## 2024-08-21 DIAGNOSIS — L82.1 SEBORRHEIC KERATOSES: ICD-10-CM

## 2024-08-21 DIAGNOSIS — D22.9 MULTIPLE BENIGN NEVI: Primary | ICD-10-CM

## 2024-08-21 DIAGNOSIS — Z12.83 ENCOUNTER FOR SCREENING FOR MALIGNANT NEOPLASM OF SKIN: ICD-10-CM

## 2024-08-21 DIAGNOSIS — L81.4 LENTIGINES: ICD-10-CM

## 2024-08-21 DIAGNOSIS — Z85.828 HISTORY OF BASAL CELL CARCINOMA (BCC): ICD-10-CM

## 2024-08-21 DIAGNOSIS — D18.01 CHERRY ANGIOMA: ICD-10-CM

## 2024-08-21 PROCEDURE — 99213 OFFICE O/P EST LOW 20 MIN: CPT | Performed by: NURSE PRACTITIONER

## 2024-08-21 NOTE — LETTER
8/21/2024      Colby Marx  [address] [facility]  [place] [address]      Dear Colleague,    Thank you [facility] your [facility], Colby Marx[facility]. Please see a copy of my visit note below.    [facility] Dermatology Note  Encounter Date: Aug 21, 2024  Office Visit     Reviewed patients past medical history and pertinent chart review prior to patients visit today.     Dermatology Problem List:  History of BCC, right upper chest, s/p ED&C 8/2011    ____________________________________________    Assessment & Plan:     # History of BCC on the right upper chest, well-healed circular scar with no signs of recurrent malignancy    # Benign skin findings including: seborrheic keratoses, cherry angioma, lentigines and benign nevi.   - No further intervention required. Patient to report changes.   - Patient reassured of the benign nature of these lesions.    #Signs and Symptoms of non-melanoma skin cancer and ABCDEs of melanoma reviewed with patient. Patient encouraged to perform monthly self skin exams and educated on how to perform them. UV precautions reviewed with patient. Patient was asked about new or changing moles/lesions on body.     #Reviewed Sunscreen: Apply 20 minutes prior to going outdoors and reapply every two hours, when wet or sweating. We recommend using an SPF 30 or higher, and to use one that is water resistant.       Follow-up:  1 years for follow up full body skin exam, prn for new or changing lesions or new concerns    Anay Woo, GUZMAN  Dermatology   ____________________________________________    CC: Skin Check (FBS, has a couple new spots to look at)    HPI:  Mr. Colby Marx is a(n) 59 year old male who presents today as a new patient for a full body skin cancer screening. Patient has concerns today about some brown rough spots on his back, chest, arms and legs. They are asymptomatic     Patient is otherwise feeling well, without  additional skin concerns.     Physical Exam:  Vitals: There were no vitals taken for this visit.  SKIN: Total skin excluding the genitalia areas was performed. The exam included the head/face, neck, both arms, chest, back, abdomen, both legs, digits, mons pubis, buttock and nails.   -Right upper chest, well-healed circular scar without signs of erythema or recurrence of malignancy  -several 1-2mm red dome shaped symmetric papules scattered on the trunk  -multiple tan/brown flat round macules and raised papules scattered throughout trunk, extremities and head. No worrisome features for malignancy noted on examination.  -scattered tan, homogenous macules scattered on sun exposed areas of trunk, extremities and face.   -scattered waxy, stuck on tan/brown papules and patches on the trunk, face, scalp, and extremities    - No other lesions of concern on areas examined.     Medications:  Current Outpatient Medications   Medication Sig Dispense Refill     apixaban ANTICOAGULANT (ELIQUIS) 5 MG tablet Take 1 tablet (5 mg) by mouth 2 times daily 180 tablet 3     cholecalciferol (VITAMIN D3) 125 mcg (5000 units) capsule Take 125 mcg by mouth daily       Ginkgo Biloba (GINKOBA PO) Take 120 mg by mouth daily       Multiple Vitamin (MULTIVITAMIN ADULT PO)        STATIN NOT PRESCRIBED, INTENTIONAL, Statin not prescribed intentionally due to normal coronary arteries and does not have significant elevated LDL.       UNABLE TO FIND MEDICATION NAME: Super Beta Prostate       valACYclovir (VALTREX) 500 MG tablet Take 1 tablet (500 mg) by mouth daily 90 tablet 3     No current facility-administered medications for this visit.      Past Medical History:   Patient Active Problem List   Diagnosis     Gastroesophageal reflux disease     NSTEMI (non-ST elevated myocardial infarction) (H)     Family history of coronary artery disease     Sleep apnea     Paroxysmal atrial fibrillation (H)     Screening for hyperlipidemia     Past Medical  History:   Diagnosis Date     Family history of abdominal aortic aneurysm      Family history of coronary artery disease     sister had SCAD episode     Fatigue      Gastro-oesophageal reflux disease      NSTEMI (non-ST elevated myocardial infarction) (H)     Aug 2015, cardiac cath: normal coronaries     Paroxysmal atrial fibrillation (H)      Sleep apnea        CC Referred Self, MD  No address on file on close of this encounter.      Again, thank you for allowing me to participate in the care of your patient.        Sincerely,        JUAN ANTONIO Alejandro CNP

## 2024-08-21 NOTE — PATIENT INSTRUCTIONS
Proper skin care from [facility]:    -Eliminate harsh soaps as they strip the natural oils from the skin, often resulting in dry itchy skin ( i.e. Dial, Zest, Citizen of [place] Spring)  -Use mild soaps such as Cetaphil or Dove Sensitive Skin in the shower. You do not need to use soap on arms, legs, and trunk every time you shower unless visibly soiled.   -Avoid hot or cold showers.  -After showering, lightly dry off and apply moisturizing within 2-3 minutes. This will help trap moisture in the skin.   -Aggressive use of a moisturizer at least 1-2 times a day to the entire body (including -Vanicream, Cetaphil, Aquaphor or Cerave) and moisturize hands after every washing.  -We recommend using moisturizers that come in a tub that needs to be scooped out, not a pump. This has more of an oil base. It will hold moisture in your skin much better than a water base moisturizer. The above recommended are non-pore clogging.      Wear a sunscreen with at least SPF 30 on your face, ears, neck and V of the chest daily. Wear sunscreen on other areas of the body if those areas are exposed to the sun throughout the day. Sunscreens can contain physical and/or chemical blockers. Physical blockers are less likely to clog pores, these include zinc oxide and titanium dioxide. Reapply every two hour and after swimming.     Sunscreen examples: https://www.ewg.org/sunscreen/    UV radiation  UVA radiation remains constant throughout the day and throughout the year. It is a longer wavelength than UVB and therefore penetrates deeper into the skin leading to immediate and delayed tanning, photoaging, and skin cancer. 70-80% of UVA and UVB radiation occurs between the hours of 10am-2pm.  UVB radiation  UVB radiation causes the most harmful effects and is more significant during the summer months. However, snow and ice can reflect UVB radiation leading to skin damage during the winter months as well. UVB radiation is responsible for tanning,  burning, inflammation, delayed erythema (pinkness), pigmentation (brown spots), and skin cancer.     I recommend self monthly full body exams and yearly full body exams with a dermatology provider. If you develop a new or changing lesion please follow up for examination. Most skin cancers are pink and scaly or pink and pearly. However, we do see blue/brown/black skin cancers.  Consider the ABCDEs of melanoma when giving yourself your monthly full body exam ( don't forget the groin, buttocks, feet, toes, etc). A-asymmetry, B-borders, C-color, D-diameter, E-elevation or evolving. If you see any of these changes please follow up in clinic. If you cannot see your back I recommend purchasing a hand held mirror to use with a larger wall mirror.       Checking for Skin Cancer  You can find cancer early by checking your skin each month. There are 3 kinds of skin cancer. They are melanoma, basal cell carcinoma, and squamous cell carcinoma. Doing monthly skin checks is the best way to find new marks or skin changes. Follow the instructions below for checking your skin.   The ABCDEs of checking moles for melanoma   Check your moles or growths for signs of melanoma using ABCDE:   Asymmetry: the sides of the mole or growth don t match  Border: the edges are ragged, notched, or blurred  Color: the color within the mole or growth varies  Diameter: the mole or growth is larger than 6 mm (size of a pencil eraser)  Evolving: the size, shape, or color of the mole or growth is changing (evolving is not shown in the images below)    Checking for other types of skin cancer  Basal cell carcinoma or squamous cell carcinoma have symptoms such as:     A spot or mole that looks different from all other marks on your skin  Changes in how an area feels, such as itching, tenderness, or pain  Changes in the skin's surface, such as oozing, bleeding, or scaliness  A sore that does not heal  New swelling or redness beyond the border of a  mole    Who s at risk?  Anyone can get skin cancer. But you are at greater risk if you have:   Fair skin, light-colored hair, or light-colored eyes  Many moles or abnormal moles on your skin  A history of sunburns from sunlight or tanning beds  A family history of skin cancer  A history of exposure to radiation or chemicals  A weakened immune system  If you have had skin cancer in the past, you are at risk for recurring skin cancer.   How to check your skin  Do your monthly skin checkups in front of a full-length mirror. Check all parts of your body, including your:   Head (ears, face, neck, and scalp)  Torso (front, back, and sides)  Arms (tops, undersides, upper, and lower armpits)  Hands (palms, backs, and fingers, including under the nails)  Buttocks and genitals  Legs (front, back, and sides)  Feet (tops, soles, toes, including under the nails, and between toes)  If you have a lot of moles, take digital photos of them each month. Make sure to take photos both up close and from a distance. These can help you see if any moles change over time.   Most skin changes are not cancer. But if you see any changes in your skin, call your doctor right away. Only he or she can diagnose a problem. If you have skin cancer, seeing your doctor can be the first step toward getting the treatment that could save your life.   [facility] last reviewed this educational content on 4/1/2019 2000-2020 [employer]. [address] [place] [address], [place] [address]. All rights reserved. This information is not intended as a substitute for professional medical care. Always follow your healthcare professional's instructions.       When should I call my doctor?  If you are worsening or not improving, please, contact us or seek urgent care as noted below.     Who should I call with questions (adults)?    [facility] [phone]  For urgent needs outside of business hours call the [facility] at  [phone] and ask for the dermatology resident on call to be paged  If this is a medical emergency and you are unable to reach an ER, Call 911      If you need a prescription refill, please contact your pharmacy. Refills are approved or denied by our Physicians during normal business hours, Monday through Fridays  Per office policy, refills will not be granted if you have not been seen within the past year (or sooner depending on your child's condition)

## 2024-08-21 NOTE — PROGRESS NOTES
[facility] Dermatology Note  Encounter Date: Aug 21, 2024  Office Visit     Reviewed patients past medical history and pertinent chart review prior to patients visit today.     Dermatology Problem List:  History of BCC, right upper chest, s/p ED&C 8/2011    ____________________________________________    Assessment & Plan:     # History of BCC on the right upper chest, well-healed circular scar with no signs of recurrent malignancy    # Benign skin findings including: seborrheic keratoses, cherry angioma, lentigines and benign nevi.   - No further intervention required. Patient to report changes.   - Patient reassured of the benign nature of these lesions.    #Signs and Symptoms of non-melanoma skin cancer and ABCDEs of melanoma reviewed with patient. Patient encouraged to perform monthly self skin exams and educated on how to perform them. UV precautions reviewed with patient. Patient was asked about new or changing moles/lesions on body.     #Reviewed Sunscreen: Apply 20 minutes prior to going outdoors and reapply every two hours, when wet or sweating. We recommend using an SPF 30 or higher, and to use one that is water resistant.       Follow-up:  1 years for follow up full body skin exam, prn for new or changing lesions or new concerns    Anay Woo CNP  Dermatology   ____________________________________________    CC: Skin Check ([facility], has a couple new spots to look at)    HPI:  Mr. Colby Marx is a(n) 59 year old male who presents today as a new patient for a full body skin cancer screening. Patient has concerns today about some brown rough spots on his back, chest, arms and legs. They are asymptomatic     Patient is otherwise feeling well, without additional skin concerns.     Physical Exam:  Vitals: There were no vitals taken for this visit.  SKIN: Total skin excluding the genitalia areas was performed. The exam included the head/face, neck, both arms, chest, back, abdomen, both legs,  digits, mons pubis, buttock and nails.   -Right upper chest, well-healed circular scar without signs of erythema or recurrence of malignancy  -several 1-2mm red dome shaped symmetric papules scattered on the trunk  -multiple tan/brown flat round macules and raised papules scattered throughout trunk, extremities and head. No worrisome features for malignancy noted on examination.  -scattered tan, homogenous macules scattered on sun exposed areas of trunk, extremities and face.   -scattered waxy, stuck on tan/brown papules and patches on the trunk, face, scalp, and extremities    - No other lesions of concern on areas examined.     Medications:  Current Outpatient Medications   Medication Sig Dispense Refill    apixaban ANTICOAGULANT (ELIQUIS) 5 MG tablet Take 1 tablet (5 mg) by mouth 2 times daily 180 tablet 3    cholecalciferol (VITAMIN D3) 125 mcg (5000 units) capsule Take 125 mcg by mouth daily      Ginkgo Biloba (GINKOBA PO) Take 120 mg by mouth daily      Multiple Vitamin (MULTIVITAMIN ADULT PO)       STATIN NOT PRESCRIBED, INTENTIONAL, Statin not prescribed intentionally due to normal coronary arteries and does not have significant elevated LDL.      UNABLE TO FIND MEDICATION NAME: Super Beta Prostate      valACYclovir (VALTREX) 500 MG tablet Take 1 tablet (500 mg) by mouth daily 90 tablet 3     No current facility-administered medications for this visit.      Past Medical History:   Patient Active Problem List   Diagnosis    Gastroesophageal reflux disease    NSTEMI (non-ST elevated myocardial infarction) (H)    Family history of coronary artery disease    Sleep apnea    Paroxysmal atrial fibrillation (H)    Screening for hyperlipidemia     Past Medical History:   Diagnosis Date    Family history of abdominal aortic aneurysm     Family history of coronary artery disease     sister had SCAD episode    Fatigue     Gastro-oesophageal reflux disease     NSTEMI (non-ST elevated myocardial infarction) (H)     Aug  2015, cardiac cath: normal coronaries    Paroxysmal atrial fibrillation (H)     Sleep apnea        CC Referred Self, MD  No address on file on close of this encounter.

## 2025-02-10 ENCOUNTER — TELEPHONE (OUTPATIENT)
Dept: CARDIOLOGY | Facility: CLINIC | Age: 60
End: 2025-02-10
Payer: COMMERCIAL

## 2025-02-10 DIAGNOSIS — I48.0 PAROXYSMAL ATRIAL FIBRILLATION (H): Primary | ICD-10-CM

## 2025-02-10 DIAGNOSIS — I21.4 NSTEMI (NON-ST ELEVATED MYOCARDIAL INFARCTION) (H): ICD-10-CM

## 2025-02-10 DIAGNOSIS — Z82.49 FAMILY HISTORY OF CORONARY ARTERY DISEASE: ICD-10-CM

## 2025-02-10 NOTE — TELEPHONE ENCOUNTER
[facility] Call Center    Phone Message    May a detailed message be left on voicemail: yes     Reason for Call: Other: pt was calling to schedule for March/April 2025 follow up with provider. Provider is booked solid for HAN and RU. Could pt see BRITTNEY vs MD as order states MD currently...please call pt back to schedule/discuss       Action Taken: Other: cardiology     Travel Screening: Not Applicable      Thank you!  Specialty Access Center        Date of Service:

## 2025-02-11 ENCOUNTER — MYC MEDICAL ADVICE (OUTPATIENT)
Dept: CARDIOLOGY | Facility: CLINIC | Age: 60
End: 2025-02-11
Payer: COMMERCIAL

## 2025-02-11 NOTE — TELEPHONE ENCOUNTER
BRITTNEY okay to schedule with  Follow-up appointment ordered to assist patient in getting scheduled.  [employer] message sent to patient with update    Juan Sarkar RN on 2/11/2025 at 8:48 AM

## 2025-03-11 DIAGNOSIS — I48.0 PAROXYSMAL ATRIAL FIBRILLATION (H): ICD-10-CM

## 2025-03-24 ENCOUNTER — OFFICE VISIT (OUTPATIENT)
Dept: CARDIOLOGY | Facility: CLINIC | Age: 60
End: 2025-03-24
Attending: INTERNAL MEDICINE
Payer: COMMERCIAL

## 2025-03-24 VITALS
BODY MASS INDEX: 25.99 KG/M2 | HEIGHT: 75 IN | SYSTOLIC BLOOD PRESSURE: 94 MMHG | WEIGHT: 209 LBS | HEART RATE: 72 BPM | DIASTOLIC BLOOD PRESSURE: 65 MMHG | OXYGEN SATURATION: 99 %

## 2025-03-24 DIAGNOSIS — I48.0 PAROXYSMAL ATRIAL FIBRILLATION (H): Primary | ICD-10-CM

## 2025-03-24 DIAGNOSIS — I21.4 NSTEMI (NON-ST ELEVATED MYOCARDIAL INFARCTION) (H): ICD-10-CM

## 2025-03-24 DIAGNOSIS — Z82.49 FAMILY HISTORY OF CORONARY ARTERY DISEASE: ICD-10-CM

## 2025-03-24 PROCEDURE — 3078F DIAST BP <80 MM HG: CPT | Performed by: NURSE PRACTITIONER

## 2025-03-24 PROCEDURE — 3074F SYST BP LT 130 MM HG: CPT | Performed by: NURSE PRACTITIONER

## 2025-03-24 PROCEDURE — 99213 OFFICE O/P EST LOW 20 MIN: CPT | Performed by: NURSE PRACTITIONER

## 2025-03-24 NOTE — LETTER
3/24/2025    Dayday Maloney PA-C  [address]  [place] [address]    RE: Colby Marx       Dear Colleague,     I had the pleasure of seeing Colby Marx in the [facility].    Cardiology Clinic Progress Note    Service Date: March 24, 2025  Primary Cardiology Team: Dr. Perea    HISTORY OF PRESENT ILLNESS:  I had the pleasure of meeting Colby Marx in the clinic today. He is a very pleasant 59 year old male with a past medical history notable for AGUSTINA, paroxysmal atrial fibrillation, anticoagulated with apixaban 5 mg twice daily, and NSTEMI in 2015.     The MI occurred in 2015. He was under a lot of stress at that time. Coronary arteries by coronary angiography were normal as was his LVEF on echocardiogram.  However, cardiac MRI showed evidence of a prior inferior infarction. He was later diagnosed with paroxysmal atrial fibrillation and it is presumed that he had thromboembolic episode from PAF leading to the infarction.  He has since been on Eliquis which he tolerates well.  The patient has been following with Dr. Perea for his cardiology care since 2020 after previously following with Dr. Shah and Dr. Upton.  He was last seen in the clinic by Dr. Perea in March 2024 and was doing well from a cardiac standpoint at that time.    He underwent a CT coronary calcium scan February 2023 which showed a total coronary calcium score of 0.     Today, Colby presents to the clinic for a routine annual follow-up.  He tells me he has been feeling quite well from a cardiac standpoint since his visit with Dr. Perea last year.  He denies any symptoms of palpitations.  He has not had chest pain, shortness of breath, or really any concerns from a cardiac standpoint.  He has been tolerating Eliquis well and denies any significant bleeding issues.  He has not had any hematuria or dark/bloody stools. He works in the disability insurance business.  He is physically active and works out several times  per week and feels he has been tolerating his usual activity levels well without any limitations or exertional symptoms recently.    ASSESSMENT:  1.  Paroxysmal atrial fibrillation, chronically anticoagulated with apixaban 5 mg twice daily.  2.  History of NSTEMI in 2015 with normal coronaries by coronary angiography, but with subsequent cardiac MRI showing evidence of inferior infarction suspected thromboembolic from PAF. No angina.    PLAN:  -Continue current cardiac regimen without changes.  -Counseled regarding continuing to stay physically active and trying to stick to a heart healthy Mediterranean style diet.  -Recommend a routine annual follow-up visit around this time next year with Dr. Perea    Thank you for the opportunity to participate in this pleasant patient's care. We would be happy to see him sooner if needed for any concerns in the meantime.    25 total minutes was spent today including chart review, precharting, history and exam, post visit documentation, and reviewing studies as outlined above.     Please kindly note that this document was completed in part using voice recognition software. Although reviewed after completion, some word substitutions and typographical errors may occur. Please contact me if clarification is needed.     JUAN ANTONIO Dyer, CNP   Nurse Practitioner  [facility]    Orders this Visit:  Orders Placed This Encounter   Procedures     Follow-Up with Cardiology     Orders Placed This Encounter   Medications     apixaban ANTICOAGULANT (ELIQUIS) 5 MG tablet     Sig: Take 1 tablet (5 mg) by mouth 2 times daily.     Dispense:  180 tablet     Refill:  3     Medications Discontinued During This Encounter   Medication Reason     apixaban ANTICOAGULANT (ELIQUIS) 5 MG tablet Reorder (No AVS)     Encounter Diagnoses   Name Primary?     Paroxysmal atrial fibrillation (H) Yes     NSTEMI (non-ST elevated myocardial infarction) (H)      Family history of coronary artery  disease        CURRENT MEDICATIONS:  Current Outpatient Medications   Medication Sig Dispense Refill     apixaban ANTICOAGULANT (ELIQUIS) 5 MG tablet Take 1 tablet (5 mg) by mouth 2 times daily. 180 tablet 3     cholecalciferol (VITAMIN D3) 125 mcg (5000 units) capsule Take 125 mcg by mouth daily       Ginkgo Biloba (GINKOBA PO) Take 120 mg by mouth daily       Multiple Vitamin (MULTIVITAMIN ADULT PO)        UNABLE TO FIND MEDICATION NAME: Super Beta Prostate       valACYclovir (VALTREX) 500 MG tablet Take 1 tablet (500 mg) by mouth daily 90 tablet 3     STATIN NOT PRESCRIBED, INTENTIONAL, Statin not prescribed intentionally due to normal coronary arteries and does not have significant elevated LDL. (Patient not taking: Reported on 3/24/2025)         ALLERGIES  No Known Allergies    PAST MEDICAL, SURGICAL, FAMILY HISTORY:  History was reviewed and updated as needed, see medical record.    SOCIAL HISTORY:  Social History     Socioeconomic History     Marital status: Single     Spouse name: Not on file     Number of children: Not on file     Years of education: Not on file     Highest education level: Not on file   Occupational History     Not on file   Tobacco Use     Smoking status: Never     Smokeless tobacco: Never   Vaping Use     Vaping status: Never Used   Substance and Sexual Activity     Alcohol use: Not Currently     Comment: rare     Drug use: No     Sexual activity: Not on file   Other Topics Concern     Parent/sibling w/ CABG, MI or angioplasty before 65F 55M? Yes      Service Not Asked     Blood Transfusions Not Asked     Caffeine Concern No     Occupational Exposure Not Asked     Hobby Hazards Not Asked     Sleep Concern Not Asked     Stress Concern Not Asked     Weight Concern Not Asked     Special Diet Yes     Comment: Gluten and dairy free     Back Care Not Asked     Exercise Yes     Comment: 2-3 times week, pilates, ellyptical and weights, yoga     Bike Helmet Not Asked     Seat Belt Yes      Self-Exams Not Asked   Social History Narrative     Not on file     Social Drivers of Health     Financial Resource Strain: Low Risk  (7/17/2024)    Financial Resource Strain      Within the past 12 months, have you or your family members you live with been unable to get utilities (heat, electricity) when it was really needed?: No   Food Insecurity: Low Risk  (7/17/2024)    Food Insecurity      Within the past 12 months, did you worry that your food would run out before you got money to buy more?: No      Within the past 12 months, did the food you bought just not last and you didn t have money to get more?: No   Transportation Needs: Low Risk  (7/17/2024)    Transportation Needs      Within the past 12 months, has lack of transportation kept you from medical appointments, getting your medicines, non-medical meetings or appointments, work, or from getting things that you need?: No   Physical Activity: Sufficiently Active (7/17/2024)    Exercise Vital Sign      Days of Exercise per Week: 3 days      Minutes of Exercise per Session: 60 min   Stress: No Stress Concern Present (7/17/2024)    [employer] - Occupational Stress Questionnaire      Feeling of Stress : Only a little   Social Connections: Unknown (7/17/2024)    Social Connection and Isolation Panel [NHANES]      Frequency of Communication with Friends and Family: Not on file      Frequency of Social Gatherings with Friends and Family: More than three times a week      Attends Nondenominational Services: Not on file      Active Member of Clubs or Organizations: Not on file      Attends Club or Organization Meetings: Not on file      Marital Status: Not on file   Interpersonal Safety: Low Risk  (7/17/2024)    Interpersonal Safety      Do you feel physically and emotionally safe where you currently live?: Yes      Within the past 12 months, have you been hit, slapped, kicked or otherwise physically hurt by someone?: No      Within the past 12  "months, have you been humiliated or emotionally abused in other ways by your partner or ex-partner?: No   Housing Stability: Low Risk  (7/17/2024)    Housing Stability      Do you have housing? : Yes      Are you worried about losing your housing?: No       Review of Systems:  Focused cardiovascular and respiratory review of systems is negative other than the symptoms noted above in the HPI.     Physical Exam:  Vitals: BP 94/65 (BP Location: Left arm, Patient Position: Sitting)   Pulse 72   Ht 1.914 m (6' 3.35\")   Wt 94.8 kg (209 lb)   SpO2 99%   BMI 25.88 kg/m     Wt Readings from Last 4 Encounters:   03/24/25 94.8 kg (209 lb)   07/17/24 93.7 kg (206 lb 9.6 oz)   03/06/24 95 kg (209 lb 8 oz)   01/23/23 91.2 kg (201 lb)     Constitutional: Alert and in no acute distress.  Neck: No JVD.  Cardiovascular:  Regular rate and rhythm. No murmur, rub or gallop.   Respiratory: Breathing non-labored. Lungs are clear to auscultation with no wheezes or crackles bilaterally.  Gastrointestinal: Abdomen non-distended.  Extremities: No lower extremity edema bilaterally.   Neuropsychiatric: No gross focal deficits. Affect appropriate. Mentation normal.     Recent Lab Results:  LIPID RESULTS:  Lab Results   Component Value Date    CHOL 168 07/17/2024    CHOL 124 07/31/2017    HDL 37 (L) 07/17/2024    HDL 33 07/31/2017     (H) 07/17/2024    LDL 79 07/31/2017    TRIG 71 07/17/2024    TRIG 59 07/31/2017     LIVER ENZYME RESULTS:  Lab Results   Component Value Date    AST 26 07/17/2024    AST 23 01/08/2016    ALT 29 07/17/2024    ALT 40 01/08/2016     CBC RESULTS:  Lab Results   Component Value Date    WBC 5.4 07/23/2021    WBC 9.0 05/08/2018    RBC 4.57 07/23/2021    RBC 4.58 05/08/2018    HGB 15.1 03/07/2022    HGB 15.2 05/08/2018    HCT 43.7 07/23/2021    HCT 43.9 05/08/2018    MCV 96 07/23/2021    MCV 96 05/08/2018    MCH 31.9 07/23/2021    MCH 33.2 (H) 05/08/2018    MCHC 33.4 07/23/2021    MCHC 34.6 05/08/2018    RDW " 12.2 07/23/2021    RDW 12.5 05/08/2018     07/23/2021     05/08/2018     BMP RESULTS:  Lab Results   Component Value Date     07/17/2024     05/08/2018    POTASSIUM 4.7 07/17/2024    POTASSIUM 4.5 03/07/2022    POTASSIUM 3.6 05/08/2018    CHLORIDE 107 07/17/2024    CHLORIDE 108 03/07/2022    CHLORIDE 108 05/08/2018    CO2 26 07/17/2024    CO2 27 03/07/2022    CO2 27 05/08/2018    ANIONGAP 7 07/17/2024    ANIONGAP 5 03/07/2022    ANIONGAP 8 05/08/2018     (H) 07/17/2024     (H) 03/07/2022     (H) 05/08/2018    BUN 16.8 07/17/2024    BUN 17 03/07/2022    BUN 17 05/08/2018    CR 1.09 07/17/2024    CR 1.13 05/08/2018    GFRESTIMATED 77 07/17/2024    GFRESTIMATED 67 05/08/2018    GFRESTBLACK 82 05/08/2018    NICOLETTE 9.1 07/17/2024    NICOLETTE 9.1 05/08/2018      CC  Herbert Perea MD  [address]  [place] [id number]      Thank you for allowing me to participate in the care of your patient.      Sincerely,     Tevin Ceballos NP     [facility]  cc:   Herbert Perea MD  [address]  [place] [id number]

## 2025-03-24 NOTE — PATIENT INSTRUCTIONS
Thank you for your visit with the [facility] today.    Today's plan:   -No medication changes today.  -Continue to try to stay physically active and try to stick to a heart healthy Mediterranean style diet  -Follow-up with Dr. Perea for a routine annual visit around this time next year    If you have questions or concerns please call the nurse team at [phone] or send a [employer] message.     Scheduling phone number: [phone]    It was a pleasure seeing you today!     JUAN ANTONIO Dyer, CNP  Nurse Practitioner  [facility]

## 2025-03-24 NOTE — PROGRESS NOTES
Cardiology Clinic Progress Note    Service Date: March 24, 2025  Primary Cardiology Team: Dr. Perea    HISTORY OF PRESENT ILLNESS:  I had the pleasure of meeting Colby Marx in the clinic today. He is a very pleasant 59 year old male with a past medical history notable for AGUSTINA, paroxysmal atrial fibrillation, anticoagulated with apixaban 5 mg twice daily, and NSTEMI in 2015.     The MI occurred in 2015. He was under a lot of stress at that time. Coronary arteries by coronary angiography were normal as was his LVEF on echocardiogram.  However, cardiac MRI showed evidence of a prior inferior infarction. He was later diagnosed with paroxysmal atrial fibrillation and it is presumed that he had thromboembolic episode from PAF leading to the infarction.  He has since been on Eliquis which he tolerates well.  The patient has been following with Dr. Perea for his cardiology care since 2020 after previously following with Dr. Shah and Dr. Upton.  He was last seen in the clinic by Dr. Perea in March 2024 and was doing well from a cardiac standpoint at that time.    He underwent a CT coronary calcium scan February 2023 which showed a total coronary calcium score of 0.     Today, Colby presents to the clinic for a routine annual follow-up.  He tells me he has been feeling quite well from a cardiac standpoint since his visit with Dr. Perea last year.  He denies any symptoms of palpitations.  He has not had chest pain, shortness of breath, or really any concerns from a cardiac standpoint.  He has been tolerating Eliquis well and denies any significant bleeding issues.  He has not had any hematuria or dark/bloody stools. He works in the disability insurance business.  He is physically active and works out several times per week and feels he has been tolerating his usual activity levels well without any limitations or exertional symptoms recently.    ASSESSMENT:  1.  Paroxysmal atrial fibrillation, chronically anticoagulated with  apixaban 5 mg twice daily.  2.  History of NSTEMI in 2015 with normal coronaries by coronary angiography, but with subsequent cardiac MRI showing evidence of inferior infarction suspected thromboembolic from PAF. No angina.    PLAN:  -Continue current cardiac regimen without changes.  -Counseled regarding continuing to stay physically active and trying to stick to a heart healthy Mediterranean style diet.  -Recommend a routine annual follow-up visit around this time next year with Dr. Perea    Thank you for the opportunity to participate in this pleasant patient's care. We would be happy to see him sooner if needed for any concerns in the meantime.    25 total minutes was spent today including chart review, precharting, history and exam, post visit documentation, and reviewing studies as outlined above.     Please kindly note that this document was completed in part using voice recognition software. Although reviewed after completion, some word substitutions and typographical errors may occur. Please contact me if clarification is needed.     JUAN ANTONIO Dyer, CNP   Nurse Practitioner  [facility]    Orders this Visit:  Orders Placed This Encounter   Procedures    Follow-Up with Cardiology     Orders Placed This Encounter   Medications    apixaban ANTICOAGULANT (ELIQUIS) 5 MG tablet     Sig: Take 1 tablet (5 mg) by mouth 2 times daily.     Dispense:  180 tablet     Refill:  3     Medications Discontinued During This Encounter   Medication Reason    apixaban ANTICOAGULANT (ELIQUIS) 5 MG tablet Reorder (No AVS)     Encounter Diagnoses   Name Primary?    Paroxysmal atrial fibrillation (H) Yes    NSTEMI (non-ST elevated myocardial infarction) (H)     Family history of coronary artery disease        CURRENT MEDICATIONS:  Current Outpatient Medications   Medication Sig Dispense Refill    apixaban ANTICOAGULANT (ELIQUIS) 5 MG tablet Take 1 tablet (5 mg) by mouth 2 times daily. 180 tablet 3    cholecalciferol  (VITAMIN D3) 125 mcg (5000 units) capsule Take 125 mcg by mouth daily      Ginkgo Biloba (GINKOBA PO) Take 120 mg by mouth daily      Multiple Vitamin (MULTIVITAMIN ADULT PO)       UNABLE TO FIND MEDICATION NAME: Super Beta Prostate      valACYclovir (VALTREX) 500 MG tablet Take 1 tablet (500 mg) by mouth daily 90 tablet 3    STATIN NOT PRESCRIBED, INTENTIONAL, Statin not prescribed intentionally due to normal coronary arteries and does not have significant elevated LDL. (Patient not taking: Reported on 3/24/2025)         ALLERGIES  No Known Allergies    PAST MEDICAL, SURGICAL, FAMILY HISTORY:  History was reviewed and updated as needed, see medical record.    SOCIAL HISTORY:  Social History     Socioeconomic History    Marital status: Single     Spouse name: Not on file    Number of children: Not on file    Years of education: Not on file    Highest education level: Not on file   Occupational History    Not on file   Tobacco Use    Smoking status: Never    Smokeless tobacco: Never   Vaping Use    Vaping status: Never Used   Substance and Sexual Activity    Alcohol use: Not Currently     Comment: rare    Drug use: No    Sexual activity: Not on file   Other Topics Concern    Parent/sibling w/ CABG, MI or angioplasty before 65F 55M? Yes     Service Not Asked    Blood Transfusions Not Asked    Caffeine Concern No    Occupational Exposure Not Asked    Hobby Hazards Not Asked    Sleep Concern Not Asked    Stress Concern Not Asked    Weight Concern Not Asked    Special Diet Yes     Comment: Gluten and dairy free    Back Care Not Asked    Exercise Yes     Comment: 2-3 times week, pilates, ellyptical and weights, yoga    Bike Helmet Not Asked    Seat Belt Yes    Self-Exams Not Asked   Social History Narrative    Not on file     Social Drivers of Health     Financial Resource Strain: Low Risk  (7/17/2024)    Financial Resource Strain     Within the past 12 months, have you or your family members you live with been  unable to get utilities (heat, electricity) when it was really needed?: No   Food Insecurity: Low Risk  (7/17/2024)    Food Insecurity     Within the past 12 months, did you worry that your food would run out before you got money to buy more?: No     Within the past 12 months, did the food you bought just not last and you didn t have money to get more?: No   Transportation Needs: Low Risk  (7/17/2024)    Transportation Needs     Within the past 12 months, has lack of transportation kept you from medical appointments, getting your medicines, non-medical meetings or appointments, work, or from getting things that you need?: No   Physical Activity: Sufficiently Active (7/17/2024)    Exercise Vital Sign     Days of Exercise per Week: 3 days     Minutes of Exercise per Session: 60 min   Stress: No Stress Concern Present (7/17/2024)    [employer] - Occupational Stress Questionnaire     Feeling of Stress : Only a little   Social Connections: Unknown (7/17/2024)    Social Connection and Isolation Panel [NHANES]     Frequency of Communication with Friends and Family: Not on file     Frequency of Social Gatherings with Friends and Family: More than three times a week     Attends Temple Services: Not on file     Active Member of Clubs or Organizations: Not on file     Attends Club or Organization Meetings: Not on file     Marital Status: Not on file   Interpersonal Safety: Low Risk  (7/17/2024)    Interpersonal Safety     Do you feel physically and emotionally safe where you currently live?: Yes     Within the past 12 months, have you been hit, slapped, kicked or otherwise physically hurt by someone?: No     Within the past 12 months, have you been humiliated or emotionally abused in other ways by your partner or ex-partner?: No   Housing Stability: Low Risk  (7/17/2024)    Housing Stability     Do you have housing? : Yes     Are you worried about losing your housing?: No       Review of  "Systems:  Focused cardiovascular and respiratory review of systems is negative other than the symptoms noted above in the HPI.     Physical Exam:  Vitals: BP 94/65 (BP Location: Left arm, Patient Position: Sitting)   Pulse 72   Ht 1.914 m (6' 3.35\")   Wt 94.8 kg (209 lb)   SpO2 99%   BMI 25.88 kg/m     Wt Readings from Last 4 Encounters:   03/24/25 94.8 kg (209 lb)   07/17/24 93.7 kg (206 lb 9.6 oz)   03/06/24 95 kg (209 lb 8 oz)   01/23/23 91.2 kg (201 lb)     Constitutional: Alert and in no acute distress.  Neck: No JVD.  Cardiovascular:  Regular rate and rhythm. No murmur, rub or gallop.   Respiratory: Breathing non-labored. Lungs are clear to auscultation with no wheezes or crackles bilaterally.  Gastrointestinal: Abdomen non-distended.  Extremities: No lower extremity edema bilaterally.   Neuropsychiatric: No gross focal deficits. Affect appropriate. Mentation normal.     Recent Lab Results:  LIPID RESULTS:  Lab Results   Component Value Date    CHOL 168 07/17/2024    CHOL 124 07/31/2017    HDL 37 (L) 07/17/2024    HDL 33 07/31/2017     (H) 07/17/2024    LDL 79 07/31/2017    TRIG 71 07/17/2024    TRIG 59 07/31/2017     LIVER ENZYME RESULTS:  Lab Results   Component Value Date    AST 26 07/17/2024    AST 23 01/08/2016    ALT 29 07/17/2024    ALT 40 01/08/2016     CBC RESULTS:  Lab Results   Component Value Date    WBC 5.4 07/23/2021    WBC 9.0 05/08/2018    RBC 4.57 07/23/2021    RBC 4.58 05/08/2018    HGB 15.1 03/07/2022    HGB 15.2 05/08/2018    HCT 43.7 07/23/2021    HCT 43.9 05/08/2018    MCV 96 07/23/2021    MCV 96 05/08/2018    MCH 31.9 07/23/2021    MCH 33.2 (H) 05/08/2018    MCHC 33.4 07/23/2021    MCHC 34.6 05/08/2018    RDW 12.2 07/23/2021    RDW 12.5 05/08/2018     07/23/2021     05/08/2018     BMP RESULTS:  Lab Results   Component Value Date     07/17/2024     05/08/2018    POTASSIUM 4.7 07/17/2024    POTASSIUM 4.5 03/07/2022    POTASSIUM 3.6 05/08/2018    " CHLORIDE 107 07/17/2024    CHLORIDE 108 03/07/2022    CHLORIDE 108 05/08/2018    CO2 26 07/17/2024    CO2 27 03/07/2022    CO2 27 05/08/2018    ANIONGAP 7 07/17/2024    ANIONGAP 5 03/07/2022    ANIONGAP 8 05/08/2018     (H) 07/17/2024     (H) 03/07/2022     (H) 05/08/2018    BUN 16.8 07/17/2024    BUN 17 03/07/2022    BUN 17 05/08/2018    CR 1.09 07/17/2024    CR 1.13 05/08/2018    GFRESTIMATED 77 07/17/2024    GFRESTIMATED 67 05/08/2018    GFRESTBLACK 82 05/08/2018    NICOLETTE 9.1 07/17/2024    NICOLETTE 9.1 05/08/2018      CC  Herbert Perea MD  [id number]  [place] [id number]

## 2025-04-30 ENCOUNTER — OFFICE VISIT (OUTPATIENT)
Dept: FAMILY MEDICINE | Facility: CLINIC | Age: 60
End: 2025-04-30
Payer: COMMERCIAL

## 2025-04-30 VITALS
TEMPERATURE: 96.1 F | OXYGEN SATURATION: 98 % | RESPIRATION RATE: 9 BRPM | DIASTOLIC BLOOD PRESSURE: 64 MMHG | HEIGHT: 75 IN | BODY MASS INDEX: 25.61 KG/M2 | WEIGHT: 206 LBS | SYSTOLIC BLOOD PRESSURE: 92 MMHG | HEART RATE: 57 BPM

## 2025-04-30 DIAGNOSIS — Z13.220 LIPID SCREENING: ICD-10-CM

## 2025-04-30 DIAGNOSIS — I21.4 NSTEMI (NON-ST ELEVATED MYOCARDIAL INFARCTION) (H): Primary | ICD-10-CM

## 2025-04-30 DIAGNOSIS — Z12.5 SCREENING FOR PROSTATE CANCER: ICD-10-CM

## 2025-04-30 DIAGNOSIS — I48.0 PAROXYSMAL ATRIAL FIBRILLATION (H): ICD-10-CM

## 2025-04-30 DIAGNOSIS — G25.81 RESTLESS LEG SYNDROME: ICD-10-CM

## 2025-04-30 DIAGNOSIS — Z00.00 ENCOUNTER FOR ANNUAL PHYSICAL EXAM: ICD-10-CM

## 2025-04-30 DIAGNOSIS — B00.9 HERPES SIMPLEX VIRUS INFECTION: ICD-10-CM

## 2025-04-30 DIAGNOSIS — Z82.49 FAMILY HISTORY OF CORONARY ARTERY DISEASE: ICD-10-CM

## 2025-04-30 LAB
ALBUMIN SERPL BCG-MCNC: 4.2 G/DL (ref 3.5–5.2)
ALP SERPL-CCNC: 92 U/L (ref 40–150)
ALT SERPL W P-5'-P-CCNC: 37 U/L (ref 0–70)
ANION GAP SERPL CALCULATED.3IONS-SCNC: 9 MMOL/L (ref 7–15)
AST SERPL W P-5'-P-CCNC: 36 U/L (ref 0–45)
BILIRUB SERPL-MCNC: 0.6 MG/DL
BUN SERPL-MCNC: 16.6 MG/DL (ref 8–23)
CALCIUM SERPL-MCNC: 9.3 MG/DL (ref 8.8–10.4)
CHLORIDE SERPL-SCNC: 107 MMOL/L (ref 98–107)
CHOLEST SERPL-MCNC: 169 MG/DL
CREAT SERPL-MCNC: 1.15 MG/DL (ref 0.67–1.17)
EGFRCR SERPLBLD CKD-EPI 2021: 72 ML/MIN/1.73M2
FASTING STATUS PATIENT QL REPORTED: ABNORMAL
FASTING STATUS PATIENT QL REPORTED: ABNORMAL
GLUCOSE SERPL-MCNC: 107 MG/DL (ref 70–99)
HCO3 SERPL-SCNC: 25 MMOL/L (ref 22–29)
HDLC SERPL-MCNC: 37 MG/DL
IRON SERPL-MCNC: 146 UG/DL (ref 61–157)
LDLC SERPL CALC-MCNC: 118 MG/DL
NONHDLC SERPL-MCNC: 132 MG/DL
POTASSIUM SERPL-SCNC: 4.5 MMOL/L (ref 3.4–5.3)
PROT SERPL-MCNC: 6.9 G/DL (ref 6.4–8.3)
PSA SERPL DL<=0.01 NG/ML-MCNC: 0.5 NG/ML (ref 0–4.5)
SODIUM SERPL-SCNC: 141 MMOL/L (ref 135–145)
TRIGL SERPL-MCNC: 71 MG/DL
VIT D+METAB SERPL-MCNC: 78 NG/ML (ref 20–50)

## 2025-04-30 RX ORDER — VALACYCLOVIR HYDROCHLORIDE 500 MG/1
500 TABLET, FILM COATED ORAL DAILY
Qty: 90 TABLET | Refills: 3 | Status: SHIPPED | OUTPATIENT
Start: 2025-04-30

## 2025-04-30 RX ORDER — MAGNESIUM GLYCINATE 100 MG
1200 TABLET ORAL AT BEDTIME
COMMUNITY

## 2025-04-30 SDOH — HEALTH STABILITY: PHYSICAL HEALTH: ON AVERAGE, HOW MANY DAYS PER WEEK DO YOU ENGAGE IN MODERATE TO STRENUOUS EXERCISE (LIKE A BRISK WALK)?: 3 DAYS

## 2025-04-30 SDOH — HEALTH STABILITY: PHYSICAL HEALTH: ON AVERAGE, HOW MANY MINUTES DO YOU ENGAGE IN EXERCISE AT THIS LEVEL?: 60 MIN

## 2025-04-30 ASSESSMENT — SOCIAL DETERMINANTS OF HEALTH (SDOH): HOW OFTEN DO YOU GET TOGETHER WITH FRIENDS OR RELATIVES?: MORE THAN THREE TIMES A WEEK

## 2025-04-30 ASSESSMENT — PAIN SCALES - GENERAL: PAINLEVEL_OUTOF10: MILD PAIN (3)

## 2025-04-30 NOTE — PROGRESS NOTES
"Preventive Care Visit  [facility] MARCIAL Redd MD, Family Medicine  Apr 30, 2025      Assessment & Plan     Encounter for annual physical exam    - Lipid panel reflex to direct LDL Fasting; Future  - Comprehensive metabolic panel; Future  - Prostate Specific Antigen Screen; Future    Lipid screening  Continue to monitor cholesterol.  If LDL is trending in the higher direction we can have discussion about some statin.  Patient has some reservation about starting the medication.  - Lipid panel reflex to direct LDL Fasting; Future  - Comprehensive metabolic panel; Future    Herpes simplex virus infection    - valACYclovir (VALTREX) 500 MG tablet; Take 1 tablet (500 mg) by mouth daily.    NSTEMI (non-ST elevated myocardial infarction) (H)  Currently no symptoms.  - Lipid panel reflex to direct LDL Fasting; Future    Paroxysmal atrial fibrillation (H)  Eliquis    Family history of coronary artery disease    - Lipid panel reflex to direct LDL Fasting; Future  - Comprehensive metabolic panel; Future    Screening for prostate cancer    - Prostate Specific Antigen Screen; Future    Restless leg syndrome    - Vitamin D Deficiency; Future  - Iron; Future    Patient has been advised of split billing requirements and indicates understanding: Yes  The longitudinal plan of care for the diagnosis(es)/condition(s) as documented were addressed during this visit. Due to the added complexity in care, I will continue to support Colby in the subsequent management and with ongoing continuity of care.        BMI  Estimated body mass index is 25.43 kg/m  as calculated from the following:    Height as of this encounter: 1.917 m (6' 3.47\").    Weight as of this encounter: 93.4 kg (206 lb).       Counseling  Appropriate preventive services were addressed with this patient via screening, questionnaire, or discussion as appropriate for fall prevention, nutrition, physical activity, Tobacco-use cessation, social " engagement, weight loss and cognition.  Checklist reviewing preventive services available has been given to the patient.  Reviewed patient's diet, addressing concerns and/or questions.   He is at risk for lack of exercise and has been provided with information to increase physical activity for the benefit of his well-being.   He is at risk for psychosocial distress and has been provided with information to reduce risk.       Follow-up    Follow-up Visit   Expected date:  Apr 30, 2026 (Approximate)      Follow Up Appointment Details:     Follow-up with whom?: Me    Follow-Up for what?: Adult Preventive    How?: In Person             Follow-up Visit   Expected date:  Apr 30, 2026 (Approximate)      Follow Up Appointment Details:     Follow-up with whom?: Me    Follow-Up for what?: Adult Preventive    How?: In Person                 Joseline Bowman is a 59 year old, presenting for the following:  Physical  Patient is a pleasant 59-year-old male underlying history of atrial fibrillation and non-ST segment elevation MI in 2015.  He has been following with cardiology currently not on statin.  Denies any chest pains no shortness of breath.  Use Valtrex daily for suppression of herpes.  Likes to exercise some sleep issues but manageable.  Also complained of some restless leg would like to check vitamin D and iron levels.      4/30/2025     8:23 AM   Additional Questions   Roomed by Damian CISNEROS      Advance Care Planning    Patient states has Health Care Directive and will send to [employer] [employer].        4/30/2025   General Health   How would you rate your overall physical health? Excellent   Feel stress (tense, anxious, or unable to sleep) To some extent   (!) STRESS CONCERN      4/30/2025   Nutrition   Three or more servings of calcium each day? (!) I DON'T KNOW   Diet: Gluten-free/reduced   How many servings of fruit and vegetables per day? 4 or more   How many sweetened beverages each day? 0-1          4/30/2025   Exercise   Days per week of moderate/strenous exercise 3 days   Average minutes spent exercising at this level 60 min         4/30/2025   Social Factors   Frequency of gathering with friends or relatives More than three times a week   Worry food won't last until get money to buy more No   Food not last or not have enough money for food? No   Do you have housing? (Housing is defined as stable permanent housing and does not include staying outside in a car, in a tent, in an abandoned building, in an overnight shelter, or couch-surfing.) Yes   Are you worried about losing your housing? No   Lack of transportation? No   Unable to get utilities (heat,electricity)? No         4/30/2025   Fall Risk   Fallen 2 or more times in the past year? No   Trouble with walking or balance? No          4/30/2025   Dental   Dentist two times every year? Yes         Today's PHQ-2 Score:       4/30/2025     8:18 AM   PHQ-2 ( 1999 [employer])   Q1: Little interest or pleasure in doing things 0   Q2: Feeling down, depressed or hopeless 0   PHQ-2 Score 0    Q1: Little interest or pleasure in doing things Not at all   Q2: Feeling down, depressed or hopeless Not at all   PHQ-2 Score 0       Patient-reported           4/30/2025   Substance Use   Alcohol more than 3/day or more than 7/wk No   Do you use any other substances recreationally? No     Social History     Tobacco Use    Smoking status: Never    Smokeless tobacco: Never   Vaping Use    Vaping status: Never Used   Substance Use Topics    Alcohol use: Not Currently     Comment: rare    Drug use: No           4/30/2025   STI Screening   New sexual partner(s) since last STI/HIV test? No   Last PSA:   Prostate Specific Antigen Screen   Date Value Ref Range Status   07/17/2024 0.55 0.00 - 4.50 ng/mL Final     ASCVD Risk   The ASCVD Risk score (Moraima GIL, et al., 2019) failed to calculate for the following reasons:    Risk score cannot be calculated because patient has a  "medical history suggesting prior/existing ASCVD          Reviewed and updated as needed this visit by Provider                    Past Medical History:   Diagnosis Date    Family history of abdominal aortic aneurysm     Family history of coronary artery disease     sister had SCAD episode    Fatigue     Gastro-oesophageal reflux disease     NSTEMI (non-ST elevated myocardial infarction) (H)     Aug 2015, cardiac cath: normal coronaries    Paroxysmal atrial fibrillation (H)     Sleep apnea      Past Surgical History:   Procedure Laterality Date    ELBOW SURGERY           Review of Systems  CONSTITUTIONAL: NEGATIVE for fever, chills, change in weight  INTEGUMENTARY/SKIN: NEGATIVE for worrisome rashes, moles or lesions  EYES: NEGATIVE for vision changes or irritation  ENT/MOUTH: NEGATIVE for ear, mouth and throat problems  RESP: NEGATIVE for significant cough or SOB  BREAST: NEGATIVE for masses, tenderness or discharge  CV: NEGATIVE for chest pain, palpitations or peripheral edema  GI: NEGATIVE for nausea, abdominal pain, heartburn, or change in bowel habits  : NEGATIVE for frequency, dysuria, or hematuria  MUSCULOSKELETAL: NEGATIVE for significant arthralgias or myalgia  NEURO: NEGATIVE for weakness, dizziness or paresthesias  ENDOCRINE: NEGATIVE for temperature intolerance, skin/hair changes  HEME: NEGATIVE for bleeding problems  PSYCHIATRIC: NEGATIVE for changes in mood or affect     Objective    Exam  BP 92/64   Pulse 57   Temp (!) 96.1  F (35.6  C) (Tympanic)   Resp (!) 9   Ht 1.917 m (6' 3.47\")   Wt 93.4 kg (206 lb)   SpO2 98%   BMI 25.43 kg/m     Estimated body mass index is 25.43 kg/m  as calculated from the following:    Height as of this encounter: 1.917 m (6' 3.47\").    Weight as of this encounter: 93.4 kg (206 lb).    Physical Exam  GENERAL: alert and no distress  EYES: Eyes grossly normal to inspection, PERRL and conjunctivae and sclerae normal  HENT: ear canals and TM's normal, nose and mouth " without ulcers or lesions  NECK: no adenopathy, no asymmetry, masses, or scars  RESP: lungs clear to auscultation - no rales, rhonchi or wheezes  CV: regular rate and rhythm, normal S1 S2, no S3 or S4, no murmur, click or rub, no peripheral edema  ABDOMEN: soft, nontender, no hepatosplenomegaly, no masses and bowel sounds normal  MS: no gross musculoskeletal defects noted, no edema  SKIN: no suspicious lesions or rashes  NEURO: Normal strength and tone, mentation intact and speech normal  PSYCH: mentation appears normal, affect normal/bright        Signed Electronically by: Lon Redd MD

## 2025-06-04 ENCOUNTER — TELEPHONE (OUTPATIENT)
Dept: CARDIOLOGY | Facility: CLINIC | Age: 60
End: 2025-06-04
Payer: COMMERCIAL

## 2025-06-04 NOTE — TELEPHONE ENCOUNTER
Health Call Center    Phone Message    May a detailed message be left on voicemail: yes     Reason for Call: Medication Question or concern regarding medication   Prescription Clarification  Name of Medication: apixaban ANTICOAGULANT (ELIQUIS) 5 MG tablet   Prescribing Provider: REKHA Ceballos   Pharmacy:    What on the order needs clarification? The patient needs to have a back injection and they want him to hold the Eliquis for 3-5 days per Dr Perea's recommendation. Please call patient to discuss as he would like to do this next week.      Action Taken: Other: Cardiology    Travel Screening: Not Applicable    Thank you!  [facility]       Date of Service:

## 2025-06-04 NOTE — TELEPHONE ENCOUNTER
Spoke with patient regarding Magdaleno's recommendations. Patient verbalized understanding and agreed with plan of care. No further questions.

## 2025-06-04 NOTE — TELEPHONE ENCOUNTER
Usually Eliquis only needs to be held for 2 days prior to procedures, but should be fine for 2-3 day hold of eliquis per the discretion of the specialist who will be doing the back injection. Thank you!

## 2025-06-25 ENCOUNTER — OFFICE VISIT (OUTPATIENT)
Dept: DERMATOLOGY | Facility: CLINIC | Age: 60
End: 2025-06-25
Payer: COMMERCIAL

## 2025-06-25 DIAGNOSIS — Z12.83 ENCOUNTER FOR SCREENING FOR MALIGNANT NEOPLASM OF SKIN: ICD-10-CM

## 2025-06-25 DIAGNOSIS — L81.4 LENTIGINES: ICD-10-CM

## 2025-06-25 DIAGNOSIS — D48.9 NEOPLASM OF UNCERTAIN BEHAVIOR: ICD-10-CM

## 2025-06-25 DIAGNOSIS — D22.9 MULTIPLE BENIGN NEVI: Primary | ICD-10-CM

## 2025-06-25 DIAGNOSIS — L82.1 SEBORRHEIC KERATOSES: ICD-10-CM

## 2025-06-25 DIAGNOSIS — D18.01 CHERRY ANGIOMA: ICD-10-CM

## 2025-06-25 NOTE — LETTER
6/25/2025      Colby Marx  [address] [facility]  [place] [address]      Dear Colleague,    Thank you for referring your patient, Colby Marx, to the [facility]. Please see a copy of my visit note below.    [facility] Dermatology Note  Encounter Date: Jun 25, 2025  Office Visit     Reviewed patients past medical history and pertinent chart review prior to patients visit today.     Dermatology Problem List:  NUB vertex scalp and left shoulder, shave biopsy 06/25/25 .   History of BCC, right upper chest, s/p ED&C 8/2011    ____________________________________________    Assessment & Plan:     # History of BCC on the right upper chest, well-healed circular scar with no signs of recurrent malignancy    # Neoplasm of uncertain behavior:  left shoulder  DDx includes Seborrheic keratosis vs melanoma. Shave biopsy today.  # Neoplasm of uncertain behavior:  vertex scalp  DDx includes BCC vs other. Shave biopsy today.    Procedure Note: Biopsy by shave technique  The risks and benefits of the procedure were described to the patient. These include but are not limited to bleeding, infection, scar, incomplete removal, and non-diagnostic biopsy. Verbal informed consent was obtained. The above site(s) was cleansed with an alcohol pad and injected with 1% lidocaine with epinephrine. Once anesthesia was obtained, a biopsy(ies) was performed with Gilette blade. The tissue(s) was placed in a labeled container(s) with formalin and sent to pathology. Hemostasis was achieved with aluminum chloride. Vaseline and a bandage were applied to the wound(s). The patient tolerated the procedure well and was given post biopsy care instructions.     # Benign skin findings including: seborrheic keratoses, cherry angioma, lentigines and benign nevi.   - No further intervention required. Patient to report changes.   - Patient reassured of the benign nature of these lesions.    #Signs and Symptoms  of non-melanoma skin cancer and ABCDEs of melanoma reviewed with patient. Patient encouraged to perform monthly self skin exams and educated on how to perform them. UV precautions reviewed with patient. Patient was asked about new or changing moles/lesions on body.     #Reviewed Sunscreen: Apply 20 minutes prior to going outdoors and reapply every two hours, when wet or sweating. We recommend using an SPF 30 or higher, and to use one that is water resistant.       Follow-up:  1 years for follow up full body skin exam, prn for new or changing lesions or new concerns    Anay Woo, GUZMAN  Dermatology   ____________________________________________    CC: Skin Check ([facility] eye //)    HPI:  Mr. Colby Marx is a(n) 59 year old male who presents today as a return patient  here for full body skin cancer screening.  He has a spot on his scalp that he can feel sometimes, and a few brown spots on his trunk that are rough and raised.  They are asymptomatic.      Patient is otherwise feeling well, without additional skin concerns.     Physical Exam:  Vitals: There were no vitals taken for this visit.  SKIN: Total skin excluding the genitalia areas was performed. The exam included the head/face, neck, both arms, chest, back, abdomen, both legs, digits, mons pubis, buttock and nails.   -left shoulder, dark brown with tan pigmentation intermixed, triangular shaped macule  -vertex scalp, subtle pink papule with one small area with surface vessels   -Right upper chest, well-healed circular scar without signs of erythema or recurrence of malignancy  -several 1-2mm red dome shaped symmetric papules scattered on the trunk  -multiple tan/brown flat round macules and raised papules scattered throughout trunk, extremities and head. No worrisome features for malignancy noted on examination.  -scattered tan, homogenous macules scattered on sun exposed areas of trunk, extremities and face.   -scattered waxy, stuck on tan/brown papules  and patches on the trunk, face, scalp, and extremities    - No other lesions of concern on areas examined.           Medications:  Current Outpatient Medications   Medication Sig Dispense Refill     apixaban ANTICOAGULANT (ELIQUIS) 5 MG tablet Take 1 tablet (5 mg) by mouth 2 times daily. 180 tablet 3     cholecalciferol (VITAMIN D3) 125 mcg (5000 units) capsule Take 125 mcg by mouth daily       Ginkgo Biloba (GINKOBA PO) Take 120 mg by mouth daily       Magnesium Bisglycinate (MAG GLYCINATE) 100 MG TABS Take 1,200 mg by mouth at bedtime.       Multiple Vitamin (MULTIVITAMIN ADULT PO)        STATIN NOT PRESCRIBED, INTENTIONAL, Statin not prescribed intentionally due to normal coronary arteries and does not have significant elevated LDL.       Tryptophan 500 MG TABS Take 1,000 mg by mouth at bedtime.       UNABLE TO FIND MEDICATION NAME: Super Beta Prostate       valACYclovir (VALTREX) 500 MG tablet Take 1 tablet (500 mg) by mouth daily. 90 tablet 3     No current facility-administered medications for this visit.      Past Medical History:   Patient Active Problem List   Diagnosis     Gastroesophageal reflux disease     NSTEMI (non-ST elevated myocardial infarction) (H)     Family history of coronary artery disease     Sleep apnea     Paroxysmal atrial fibrillation (H)     Screening for hyperlipidemia     Past Medical History:   Diagnosis Date     Family history of abdominal aortic aneurysm      Family history of coronary artery disease     sister had SCAD episode     Fatigue      Gastro-oesophageal reflux disease      NSTEMI (non-ST elevated myocardial infarction) (H)     Aug 2015, cardiac cath: normal coronaries     Paroxysmal atrial fibrillation (H)      Sleep apnea        CC Referred Self, MD  No address on file on close of this encounter.    Again, thank you for allowing me to participate in the care of your patient.        Sincerely,        JUAN ANTONIO Alejandro CNP    Electronically signed

## 2025-06-25 NOTE — PATIENT INSTRUCTIONS
Proper skin care from [facility]:    -Eliminate harsh soaps as they strip the natural oils from the skin, often resulting in dry itchy skin ( i.e. Dial, Zest, [place] Spring)  -Use mild soaps such as Cetaphil or Dove Sensitive Skin in the shower. You do not need to use soap on arms, legs, and trunk every time you shower unless visibly soiled.   -Avoid hot or cold showers.  -After showering, lightly dry off and apply moisturizing within 2-3 minutes. This will help trap moisture in the skin.   -Aggressive use of a moisturizer at least 1-2 times a day to the entire body (including -Vanicream, Cetaphil, Aquaphor or Cerave) and moisturize hands after every washing.  -We recommend using moisturizers that come in a tub that needs to be scooped out, not a pump. This has more of an oil base. It will hold moisture in your skin much better than a water base moisturizer. The above recommended are non-pore clogging.      Wear a sunscreen with at least SPF 30 on your face, ears, neck and V of the chest daily. Wear sunscreen on other areas of the body if those areas are exposed to the sun throughout the day. Sunscreens can contain physical and/or chemical blockers. Physical blockers are less likely to clog pores, these include zinc oxide and titanium dioxide. Reapply every two hour and after swimming.     Sunscreen examples: https://www.ewg.org/sunscreen/    UV radiation  UVA radiation remains constant throughout the day and throughout the year. It is a longer wavelength than UVB and therefore penetrates deeper into the skin leading to immediate and delayed tanning, photoaging, and skin cancer. 70-80% of UVA and UVB radiation occurs between the hours of 10am-2pm.  UVB radiation  UVB radiation causes the most harmful effects and is more significant during the summer months. However, snow and ice can reflect UVB radiation leading to skin damage during the winter months as well. UVB radiation is responsible for tanning,  burning, inflammation, delayed erythema (pinkness), pigmentation (brown spots), and skin cancer.     I recommend self monthly full body exams and yearly full body exams with a dermatology provider. If you develop a new or changing lesion please follow up for examination. Most skin cancers are pink and scaly or pink and pearly. However, we do see blue/brown/black skin cancers.  Consider the ABCDEs of melanoma when giving yourself your monthly full body exam ( don't forget the groin, buttocks, feet, toes, etc). A-asymmetry, B-borders, C-color, D-diameter, E-elevation or evolving. If you see any of these changes please follow up in clinic. If you cannot see your back I recommend purchasing a hand held mirror to use with a larger wall mirror.       Checking for Skin Cancer  You can find cancer early by checking your skin each month. There are 3 kinds of skin cancer. They are melanoma, basal cell carcinoma, and squamous cell carcinoma. Doing monthly skin checks is the best way to find new marks or skin changes. Follow the instructions below for checking your skin.   The ABCDEs of checking moles for melanoma   Check your moles or growths for signs of melanoma using ABCDE:   Asymmetry: the sides of the mole or growth don t match  Border: the edges are ragged, notched, or blurred  Color: the color within the mole or growth varies  Diameter: the mole or growth is larger than 6 mm (size of a pencil eraser)  Evolving: the size, shape, or color of the mole or growth is changing (evolving is not shown in the images below)    Checking for other types of skin cancer  Basal cell carcinoma or squamous cell carcinoma have symptoms such as:     A spot or mole that looks different from all other marks on your skin  Changes in how an area feels, such as itching, tenderness, or pain  Changes in the skin's surface, such as oozing, bleeding, or scaliness  A sore that does not heal  New swelling or redness beyond the border of a  mole    Who s at risk?  Anyone can get skin cancer. But you are at greater risk if you have:   Fair skin, light-colored hair, or light-colored eyes  Many moles or abnormal moles on your skin  A history of sunburns from sunlight or tanning beds  A family history of skin cancer  A history of exposure to radiation or chemicals  A weakened immune system  If you have had skin cancer in the past, you are at risk for recurring skin cancer.   How to check your skin  Do your monthly skin checkups in front of a full-length mirror. Check all parts of your body, including your:   Head (ears, face, neck, and scalp)  Torso (front, back, and sides)  Arms (tops, undersides, upper, and lower armpits)  Hands (palms, backs, and fingers, including under the nails)  Buttocks and genitals  Legs (front, back, and sides)  Feet (tops, soles, toes, including under the nails, and between toes)  If you have a lot of moles, take digital photos of them each month. Make sure to take photos both up close and from a distance. These can help you see if any moles change over time.   Most skin changes are not cancer. But if you see any changes in your skin, call your doctor right away. Only he or she can diagnose a problem. If you have skin cancer, seeing your doctor can be the first step toward getting the treatment that could save your life.   [employer] last reviewed this educational content on 4/1/2019 2000-2020 [employer]. [address] [place] [address], [place] [address]. All rights reserved. This information is not intended as a substitute for professional medical care. Always follow your healthcare professional's instructions.       When should I call my doctor?  If you are worsening or not improving, please, contact us or seek urgent care as noted below.     Who should I call with questions (adults)?    [facility] [phone]  For urgent needs outside of business hours call the [facility] at  [phone] and ask for the dermatology resident on call to be paged  If this is a medical emergency and you are unable to reach an ER, Call [phone]      If you need a prescription refill, please contact your pharmacy. Refills are approved or denied by our Physicians during normal business hours, Monday through Friday.  Per office policy, refills will not be granted if you have not been seen within the past year (or sooner depending on the condition).

## 2025-06-25 NOTE — PROGRESS NOTES
[facility] Dermatology Note  Encounter Date: Jun 25, 2025  Office Visit     Reviewed patients past medical history and pertinent chart review prior to patients visit today.     Dermatology Problem List:  NUB vertex scalp and left shoulder, shave biopsy 06/25/25 .   History of BCC, right upper chest, s/p ED&C 8/2011    ____________________________________________    Assessment & Plan:     # History of BCC on the right upper chest, well-healed circular scar with no signs of recurrent malignancy    # Neoplasm of uncertain behavior:  left shoulder  DDx includes Seborrheic keratosis vs melanoma. Shave biopsy today.  # Neoplasm of uncertain behavior:  vertex scalp  DDx includes BCC vs other. Shave biopsy today.    Procedure Note: Biopsy by shave technique  The risks and benefits of the procedure were described to the patient. These include but are not limited to bleeding, infection, scar, incomplete removal, and non-diagnostic biopsy. Verbal informed consent was obtained. The above site(s) was cleansed with an alcohol pad and injected with 1% lidocaine with epinephrine. Once anesthesia was obtained, a biopsy(ies) was performed with Gilette blade. The tissue(s) was placed in a labeled container(s) with formalin and sent to pathology. Hemostasis was achieved with aluminum chloride. Vaseline and a bandage were applied to the wound(s). The patient tolerated the procedure well and was given post biopsy care instructions.     # Benign skin findings including: seborrheic keratoses, cherry angioma, lentigines and benign nevi.   - No further intervention required. Patient to report changes.   - Patient reassured of the benign nature of these lesions.    #Signs and Symptoms of non-melanoma skin cancer and ABCDEs of melanoma reviewed with patient. Patient encouraged to perform monthly self skin exams and educated on how to perform them. UV precautions reviewed with patient. Patient was asked about new or changing  moles/lesions on body.     #Reviewed Sunscreen: Apply 20 minutes prior to going outdoors and reapply every two hours, when wet or sweating. We recommend using an SPF 30 or higher, and to use one that is water resistant.       Follow-up:  1 years for follow up full body skin exam, prn for new or changing lesions or new concerns    Anay Woo, GUZMAN  Dermatology   ____________________________________________    CC: Skin Check ([facility], [email or web address] R eye //)    HPI:  Mr. Colby Marx is a(n) 59 year old male who presents today as a return patient  here for full body skin cancer screening.  He has a spot on his scalp that he can feel sometimes, and a few brown spots on his trunk that are rough and raised.  They are asymptomatic.      Patient is otherwise feeling well, without additional skin concerns.     Physical Exam:  Vitals: There were no vitals taken for this visit.  SKIN: Total skin excluding the genitalia areas was performed. The exam included the head/face, neck, both arms, chest, back, abdomen, both legs, digits, mons pubis, buttock and nails.   -left shoulder, dark brown with tan pigmentation intermixed, triangular shaped macule  -vertex scalp, subtle pink papule with one small area with surface vessels   -Right upper chest, well-healed circular scar without signs of erythema or recurrence of malignancy  -several 1-2mm red dome shaped symmetric papules scattered on the trunk  -multiple tan/brown flat round macules and raised papules scattered throughout trunk, extremities and head. No worrisome features for malignancy noted on examination.  -scattered tan, homogenous macules scattered on sun exposed areas of trunk, extremities and face.   -scattered waxy, stuck on tan/brown papules and patches on the trunk, face, scalp, and extremities    - No other lesions of concern on areas examined.           Medications:  Current Outpatient Medications   Medication Sig Dispense Refill    apixaban ANTICOAGULANT (ELIQUIS) 5 MG tablet  Take 1 tablet (5 mg) by mouth 2 times daily. 180 tablet 3    cholecalciferol (VITAMIN D3) 125 mcg (5000 units) capsule Take 125 mcg by mouth daily      Ginkgo Biloba (GINKOBA PO) Take 120 mg by mouth daily      Magnesium Bisglycinate (MAG GLYCINATE) 100 MG TABS Take 1,200 mg by mouth at bedtime.      Multiple Vitamin (MULTIVITAMIN ADULT PO)       STATIN NOT PRESCRIBED, INTENTIONAL, Statin not prescribed intentionally due to normal coronary arteries and does not have significant elevated LDL.      Tryptophan 500 MG TABS Take 1,000 mg by mouth at bedtime.      UNABLE TO FIND MEDICATION NAME: Super Beta Prostate      valACYclovir (VALTREX) 500 MG tablet Take 1 tablet (500 mg) by mouth daily. 90 tablet 3     No current facility-administered medications for this visit.      Past Medical History:   Patient Active Problem List   Diagnosis    Gastroesophageal reflux disease    NSTEMI (non-ST elevated myocardial infarction) (H)    Family history of coronary artery disease    Sleep apnea    Paroxysmal atrial fibrillation (H)    Screening for hyperlipidemia     Past Medical History:   Diagnosis Date    Family history of abdominal aortic aneurysm     Family history of coronary artery disease     sister had SCAD episode    Fatigue     Gastro-oesophageal reflux disease     NSTEMI (non-ST elevated myocardial infarction) (H)     Aug 2015, cardiac cath: normal coronaries    Paroxysmal atrial fibrillation (H)     Sleep apnea        CC Referred Self, MD  No address on file on close of this encounter.

## 2025-06-29 LAB
PATH REPORT.COMMENTS IMP SPEC: ABNORMAL
PATH REPORT.COMMENTS IMP SPEC: ABNORMAL
PATH REPORT.COMMENTS IMP SPEC: YES
PATH REPORT.FINAL DX SPEC: ABNORMAL
PATH REPORT.GROSS SPEC: ABNORMAL
PATH REPORT.MICROSCOPIC SPEC OTHER STN: ABNORMAL
PATH REPORT.RELEVANT HX SPEC: ABNORMAL

## 2025-07-02 ENCOUNTER — RESULTS FOLLOW-UP (OUTPATIENT)
Dept: DERMATOLOGY | Facility: CLINIC | Age: 60
End: 2025-07-02

## 2025-07-02 DIAGNOSIS — C44.41 BASAL CELL CARCINOMA (BCC) OF SCALP: Primary | ICD-10-CM

## 2025-08-13 ENCOUNTER — NURSE TRIAGE (OUTPATIENT)
Dept: FAMILY MEDICINE | Facility: CLINIC | Age: 60
End: 2025-08-13

## 2025-08-13 ENCOUNTER — ANCILLARY PROCEDURE (OUTPATIENT)
Dept: ULTRASOUND IMAGING | Facility: CLINIC | Age: 60
End: 2025-08-13
Attending: PREVENTIVE MEDICINE
Payer: COMMERCIAL

## 2025-08-13 VITALS
WEIGHT: 208 LBS | BODY MASS INDEX: 25.67 KG/M2 | DIASTOLIC BLOOD PRESSURE: 57 MMHG | HEART RATE: 51 BPM | RESPIRATION RATE: 20 BRPM | SYSTOLIC BLOOD PRESSURE: 113 MMHG | TEMPERATURE: 97.2 F

## 2025-08-13 DIAGNOSIS — I86.1 LEFT VARICOCELE: Primary | ICD-10-CM

## 2025-08-13 DIAGNOSIS — N50.82 SCROTAL PAIN: ICD-10-CM

## 2025-08-13 DIAGNOSIS — N50.3 EPIDIDYMAL CYST: ICD-10-CM

## 2025-08-13 PROCEDURE — 3078F DIAST BP <80 MM HG: CPT | Performed by: PREVENTIVE MEDICINE

## 2025-08-13 PROCEDURE — 93976 VASCULAR STUDY: CPT

## 2025-08-13 PROCEDURE — 3074F SYST BP LT 130 MM HG: CPT | Performed by: PREVENTIVE MEDICINE

## 2025-08-13 PROCEDURE — 99215 OFFICE O/P EST HI 40 MIN: CPT | Performed by: PREVENTIVE MEDICINE

## 2025-08-15 ENCOUNTER — TRANSFERRED RECORDS (OUTPATIENT)
Dept: HEALTH INFORMATION MANAGEMENT | Facility: CLINIC | Age: 60
End: 2025-08-15
Payer: COMMERCIAL